# Patient Record
Sex: MALE | Race: WHITE | NOT HISPANIC OR LATINO | Employment: FULL TIME
[De-identification: names, ages, dates, MRNs, and addresses within clinical notes are randomized per-mention and may not be internally consistent; named-entity substitution may affect disease eponyms.]

---

## 2018-03-05 ENCOUNTER — HOSPITAL (OUTPATIENT)
Dept: OTHER | Age: 23
End: 2018-03-05
Attending: FAMILY MEDICINE

## 2018-11-10 ENCOUNTER — HOSPITAL (OUTPATIENT)
Dept: OTHER | Age: 23
End: 2018-11-10

## 2018-11-11 ENCOUNTER — DIAGNOSTIC TRANS (OUTPATIENT)
Dept: OTHER | Age: 23
End: 2018-11-11

## 2018-12-10 ENCOUNTER — OFFICE VISIT (OUTPATIENT)
Dept: NEUROLOGY | Age: 23
End: 2018-12-10

## 2018-12-10 ENCOUNTER — HOSPITAL (OUTPATIENT)
Dept: OTHER | Age: 23
End: 2018-12-10
Attending: FAMILY MEDICINE

## 2018-12-10 ENCOUNTER — TELEPHONE (OUTPATIENT)
Dept: NEUROLOGY | Age: 23
End: 2018-12-10

## 2018-12-10 VITALS
RESPIRATION RATE: 16 BRPM | DIASTOLIC BLOOD PRESSURE: 68 MMHG | SYSTOLIC BLOOD PRESSURE: 120 MMHG | HEIGHT: 69 IN | HEART RATE: 75 BPM | BODY MASS INDEX: 22.3 KG/M2 | WEIGHT: 150.57 LBS

## 2018-12-10 DIAGNOSIS — G62.9 NEUROPATHY: Primary | ICD-10-CM

## 2018-12-12 ENCOUNTER — APPOINTMENT (OUTPATIENT)
Dept: NEUROLOGY | Age: 23
End: 2018-12-12

## 2018-12-12 PROBLEM — G62.9 NEUROPATHY: Status: ACTIVE | Noted: 2018-12-12

## 2019-01-01 ENCOUNTER — HOSPITAL (OUTPATIENT)
Dept: OTHER | Age: 24
End: 2019-01-01
Attending: FAMILY MEDICINE

## 2019-01-10 ENCOUNTER — TELEPHONE (OUTPATIENT)
Dept: NEUROLOGY | Age: 24
End: 2019-01-10

## 2019-01-31 ENCOUNTER — HOSPITAL (OUTPATIENT)
Dept: OTHER | Age: 24
End: 2019-01-31
Attending: OTOLARYNGOLOGY

## 2019-02-01 ENCOUNTER — HOSPITAL (OUTPATIENT)
Dept: OTHER | Age: 24
End: 2019-02-01
Attending: FAMILY MEDICINE

## 2019-03-11 ENCOUNTER — HOSPITAL (OUTPATIENT)
Dept: OTHER | Age: 24
End: 2019-03-11
Attending: FAMILY MEDICINE

## 2019-04-01 ENCOUNTER — HOSPITAL (OUTPATIENT)
Dept: OTHER | Age: 24
End: 2019-04-01
Attending: FAMILY MEDICINE

## 2019-05-01 ENCOUNTER — HOSPITAL (OUTPATIENT)
Dept: OTHER | Age: 24
End: 2019-05-01
Attending: FAMILY MEDICINE

## 2020-03-06 ENCOUNTER — HOSPITAL ENCOUNTER (OUTPATIENT)
Dept: GENERAL RADIOLOGY | Age: 25
Discharge: HOME | End: 2020-03-06

## 2020-03-06 DIAGNOSIS — M25.531 PAIN IN RIGHT WRIST: ICD-10-CM

## 2020-03-06 PROCEDURE — 73110 X-RAY EXAM OF WRIST: CPT

## 2021-12-01 ENCOUNTER — LAB REQUISITION (OUTPATIENT)
Dept: LAB | Age: 26
End: 2021-12-01

## 2021-12-01 ENCOUNTER — HOSPITAL ENCOUNTER (OUTPATIENT)
Dept: GENERAL RADIOLOGY | Age: 26
Discharge: HOME | End: 2021-12-01

## 2021-12-01 ENCOUNTER — LAB SERVICES (OUTPATIENT)
Dept: LAB | Age: 26
End: 2021-12-01

## 2021-12-01 DIAGNOSIS — M25.561 PAIN IN RIGHT KNEE: ICD-10-CM

## 2021-12-01 DIAGNOSIS — R53.83 OTHER FATIGUE: ICD-10-CM

## 2021-12-01 DIAGNOSIS — Z00.00 ENCOUNTER FOR GENERAL ADULT MEDICAL EXAMINATION WITHOUT ABNORMAL FINDINGS: ICD-10-CM

## 2021-12-01 LAB
25(OH)D3+25(OH)D2 SERPL-MCNC: 22 NG/ML (ref 30–100)
ALBUMIN SERPL-MCNC: 4.3 G/DL (ref 3.6–5.1)
ALBUMIN/GLOB SERPL: 1.4 {RATIO} (ref 1–2.4)
ALP SERPL-CCNC: 42 UNITS/L (ref 45–117)
ALT SERPL-CCNC: 29 UNITS/L
ANION GAP SERPL CALC-SCNC: 10 MMOL/L (ref 10–20)
APPEARANCE UR: CLEAR
AST SERPL-CCNC: 15 UNITS/L
BASOPHILS # BLD: 0 K/MCL (ref 0–0.3)
BASOPHILS NFR BLD: 0 %
BILIRUB SERPL-MCNC: 0.7 MG/DL (ref 0.2–1)
BILIRUB UR QL STRIP: NEGATIVE
BUN SERPL-MCNC: 19 MG/DL (ref 6–20)
BUN/CREAT SERPL: 19 (ref 7–25)
CALCIUM SERPL-MCNC: 9.3 MG/DL (ref 8.4–10.2)
CHLORIDE SERPL-SCNC: 104 MMOL/L (ref 98–107)
CHOLEST SERPL-MCNC: 138 MG/DL
CHOLEST/HDLC SERPL: 2.7 {RATIO}
CO2 SERPL-SCNC: 30 MMOL/L (ref 21–32)
COLOR UR: YELLOW
CREAT SERPL-MCNC: 0.99 MG/DL (ref 0.67–1.17)
CRP SERPL-MCNC: <0.3 MG/DL
DEPRECATED RDW RBC: 38 FL (ref 39–50)
EOSINOPHIL # BLD: 0.2 K/MCL (ref 0–0.5)
EOSINOPHIL NFR BLD: 4 %
ERYTHROCYTE [DISTWIDTH] IN BLOOD: 11.9 % (ref 11–15)
ERYTHROCYTE [SEDIMENTATION RATE] IN BLOOD BY WESTERGREN METHOD: <1 MM/HR (ref 0–20)
FASTING DURATION TIME PATIENT: 0 HOURS (ref 0–999)
FASTING DURATION TIME PATIENT: 0 HOURS (ref 0–999)
GFR SERPLBLD BASED ON 1.73 SQ M-ARVRAT: >90 ML/MIN
GLOBULIN SER-MCNC: 3.1 G/DL (ref 2–4)
GLUCOSE SERPL-MCNC: 85 MG/DL (ref 70–99)
GLUCOSE UR STRIP-MCNC: NEGATIVE MG/DL
HCT VFR BLD CALC: 44.1 % (ref 39–51)
HDLC SERPL-MCNC: 51 MG/DL
HGB BLD-MCNC: 15.1 G/DL (ref 13–17)
HGB UR QL STRIP: NEGATIVE
IMM GRANULOCYTES # BLD AUTO: 0 K/MCL (ref 0–0.2)
IMM GRANULOCYTES # BLD: 0 %
KETONES UR STRIP-MCNC: NEGATIVE MG/DL
LDLC SERPL CALC-MCNC: 73 MG/DL
LEUKOCYTE ESTERASE UR QL STRIP: NEGATIVE
LYMPHOCYTES # BLD: 1.2 K/MCL (ref 1–4.8)
LYMPHOCYTES NFR BLD: 22 %
MCH RBC QN AUTO: 29.7 PG (ref 26–34)
MCHC RBC AUTO-ENTMCNC: 34.2 G/DL (ref 32–36.5)
MCV RBC AUTO: 86.8 FL (ref 78–100)
MONOCYTES # BLD: 0.3 K/MCL (ref 0.3–0.9)
MONOCYTES NFR BLD: 5 %
NEUTROPHILS # BLD: 3.7 K/MCL (ref 1.8–7.7)
NEUTROPHILS NFR BLD: 69 %
NITRITE UR QL STRIP: NEGATIVE
NONHDLC SERPL-MCNC: 87 MG/DL
NRBC BLD MANUAL-RTO: 0 /100 WBC
PH UR STRIP: 6 [PH] (ref 5–7)
PLATELET # BLD AUTO: 225 K/MCL (ref 140–450)
POTASSIUM SERPL-SCNC: 4 MMOL/L (ref 3.4–5.1)
PROT SERPL-MCNC: 7.4 G/DL (ref 6.4–8.2)
PROT UR STRIP-MCNC: NEGATIVE MG/DL
RBC # BLD: 5.08 MIL/MCL (ref 4.5–5.9)
SODIUM SERPL-SCNC: 140 MMOL/L (ref 135–145)
SP GR UR STRIP: 1.02 (ref 1–1.03)
TRIGL SERPL-MCNC: 69 MG/DL
TSH SERPL-ACNC: 1.13 MCUNITS/ML (ref 0.35–5)
UROBILINOGEN UR STRIP-MCNC: 0.2 MG/DL
VIT B12 SERPL-MCNC: 702 PG/ML (ref 211–911)
WBC # BLD: 5.4 K/MCL (ref 4.2–11)

## 2021-12-01 PROCEDURE — 85652 RBC SED RATE AUTOMATED: CPT | Performed by: CLINICAL MEDICAL LABORATORY

## 2021-12-01 PROCEDURE — 80053 COMPREHEN METABOLIC PANEL: CPT | Performed by: CLINICAL MEDICAL LABORATORY

## 2021-12-01 PROCEDURE — 86140 C-REACTIVE PROTEIN: CPT | Performed by: CLINICAL MEDICAL LABORATORY

## 2021-12-01 PROCEDURE — 83540 ASSAY OF IRON: CPT | Performed by: CLINICAL MEDICAL LABORATORY

## 2021-12-01 PROCEDURE — 82607 VITAMIN B-12: CPT | Performed by: CLINICAL MEDICAL LABORATORY

## 2021-12-01 PROCEDURE — 36415 COLL VENOUS BLD VENIPUNCTURE: CPT | Performed by: CLINICAL MEDICAL LABORATORY

## 2021-12-01 PROCEDURE — 84443 ASSAY THYROID STIM HORMONE: CPT | Performed by: CLINICAL MEDICAL LABORATORY

## 2021-12-01 PROCEDURE — 81003 URINALYSIS AUTO W/O SCOPE: CPT | Performed by: CLINICAL MEDICAL LABORATORY

## 2021-12-01 PROCEDURE — 85025 COMPLETE CBC W/AUTO DIFF WBC: CPT | Performed by: CLINICAL MEDICAL LABORATORY

## 2021-12-01 PROCEDURE — 73564 X-RAY EXAM KNEE 4 OR MORE: CPT

## 2021-12-01 PROCEDURE — 86038 ANTINUCLEAR ANTIBODIES: CPT | Performed by: CLINICAL MEDICAL LABORATORY

## 2021-12-01 PROCEDURE — 80061 LIPID PANEL: CPT | Performed by: CLINICAL MEDICAL LABORATORY

## 2021-12-01 PROCEDURE — 83550 IRON BINDING TEST: CPT | Performed by: CLINICAL MEDICAL LABORATORY

## 2021-12-01 PROCEDURE — 82306 VITAMIN D 25 HYDROXY: CPT | Performed by: CLINICAL MEDICAL LABORATORY

## 2021-12-01 PROCEDURE — 82728 ASSAY OF FERRITIN: CPT | Performed by: CLINICAL MEDICAL LABORATORY

## 2021-12-02 ENCOUNTER — HOSPITAL ENCOUNTER (OUTPATIENT)
Dept: MRI IMAGING | Age: 26
Discharge: HOME | End: 2021-12-02
Attending: ORTHOPAEDIC SURGERY

## 2021-12-02 DIAGNOSIS — M25.531 RIGHT WRIST PAIN: ICD-10-CM

## 2021-12-02 DIAGNOSIS — R22.31 MASS OF RIGHT WRIST: ICD-10-CM

## 2021-12-02 LAB — ANA SER QL IA: NEGATIVE

## 2021-12-02 PROCEDURE — 10002805 HB CONTRAST AGENT

## 2021-12-02 PROCEDURE — 73223 MRI JOINT UPR EXTR W/O&W/DYE: CPT

## 2021-12-02 PROCEDURE — A9585 GADOBUTROL INJECTION: HCPCS

## 2021-12-02 RX ORDER — GADOBUTROL 604.72 MG/ML
6.8 INJECTION INTRAVENOUS ONCE
Status: COMPLETED | OUTPATIENT
Start: 2021-12-02 | End: 2021-12-02

## 2021-12-02 RX ADMIN — GADOBUTROL 6.8 ML: 604.72 INJECTION INTRAVENOUS at 07:45

## 2021-12-06 LAB
FERRITIN SERPL-MCNC: 214 NG/ML (ref 26–388)
IRON SATN MFR SERPL: 29 % (ref 15–45)
IRON SERPL-MCNC: 87 MCG/DL (ref 65–175)
TIBC SERPL-MCNC: 296 MCG/DL (ref 250–450)

## 2022-01-11 ENCOUNTER — LAB SERVICES (OUTPATIENT)
Dept: LAB | Age: 27
End: 2022-01-11

## 2022-01-11 ENCOUNTER — LAB REQUISITION (OUTPATIENT)
Dept: LAB | Age: 27
End: 2022-01-11
Attending: FAMILY MEDICINE

## 2022-01-11 DIAGNOSIS — E55.9 VITAMIN D DEFICIENCY, UNSPECIFIED: ICD-10-CM

## 2022-01-11 LAB — 25(OH)D3+25(OH)D2 SERPL-MCNC: 48 NG/ML (ref 30–100)

## 2022-01-11 PROCEDURE — 82306 VITAMIN D 25 HYDROXY: CPT | Performed by: CLINICAL MEDICAL LABORATORY

## 2022-01-11 PROCEDURE — 36415 COLL VENOUS BLD VENIPUNCTURE: CPT | Performed by: CLINICAL MEDICAL LABORATORY

## 2022-01-25 ENCOUNTER — TELEPHONE (OUTPATIENT)
Dept: NEUROLOGY | Age: 27
End: 2022-01-25

## 2022-01-31 PROBLEM — M77.10 LATERAL EPICONDYLITIS: Status: ACTIVE | Noted: 2019-04-12

## 2022-01-31 PROBLEM — R42 DIZZINESS: Status: ACTIVE | Noted: 2022-01-31

## 2022-01-31 RX ORDER — TRIAMCINOLONE ACETONIDE 1 MG/G
CREAM TOPICAL
COMMUNITY
Start: 2021-10-26 | End: 2023-03-15 | Stop reason: SDUPTHER

## 2022-01-31 RX ORDER — NAPROXEN 500 MG/1
TABLET ORAL
COMMUNITY
Start: 2021-12-08 | End: 2022-08-17 | Stop reason: ALTCHOICE

## 2022-01-31 RX ORDER — OMEPRAZOLE 20 MG/1
CAPSULE, DELAYED RELEASE ORAL
COMMUNITY
Start: 2022-01-10 | End: 2022-08-17 | Stop reason: ALTCHOICE

## 2022-02-01 ENCOUNTER — OFFICE VISIT (OUTPATIENT)
Dept: NEUROLOGY | Age: 27
End: 2022-02-01

## 2022-02-01 VITALS
BODY MASS INDEX: 25.01 KG/M2 | HEART RATE: 75 BPM | SYSTOLIC BLOOD PRESSURE: 116 MMHG | DIASTOLIC BLOOD PRESSURE: 62 MMHG | WEIGHT: 168.87 LBS | HEIGHT: 69 IN

## 2022-02-01 DIAGNOSIS — R25.1 PHYSIOLOGICAL TREMOR: Primary | ICD-10-CM

## 2022-02-01 DIAGNOSIS — F41.9 ANXIETY, MILD: ICD-10-CM

## 2022-02-01 PROBLEM — M77.10 LATERAL EPICONDYLITIS: Status: RESOLVED | Noted: 2019-04-12 | Resolved: 2022-02-01

## 2022-02-01 PROBLEM — M79.641 RIGHT HAND PAIN: Status: ACTIVE | Noted: 2022-02-01

## 2022-02-01 PROCEDURE — 99204 OFFICE O/P NEW MOD 45 MIN: CPT | Performed by: PSYCHIATRY & NEUROLOGY

## 2022-03-22 ENCOUNTER — HOSPITAL ENCOUNTER (OUTPATIENT)
Dept: GENERAL RADIOLOGY | Age: 27
Discharge: HOME | End: 2022-03-22
Attending: FAMILY MEDICINE

## 2022-03-22 DIAGNOSIS — M54.50 LOW BACK PAIN: ICD-10-CM

## 2022-03-22 PROCEDURE — 72100 X-RAY EXAM L-S SPINE 2/3 VWS: CPT

## 2022-04-07 ENCOUNTER — HOSPITAL ENCOUNTER (OUTPATIENT)
Dept: PHYSICAL MEDICINE AND REHAB | Age: 27
Discharge: STILL ADMITTED OR ADMINISTRATIVE | End: 2022-04-07

## 2022-04-07 DIAGNOSIS — M54.50 LOW BACK PAIN: Primary | ICD-10-CM

## 2022-04-07 DIAGNOSIS — M25.559 HIP PAIN: ICD-10-CM

## 2022-04-07 PROCEDURE — 97161 PT EVAL LOW COMPLEX 20 MIN: CPT

## 2022-04-07 PROCEDURE — 97110 THERAPEUTIC EXERCISES: CPT

## 2022-04-07 ASSESSMENT — MOVEMENT AND STRENGTH ASSESSMENTS
SITTING FOR 1 HOUR: A LITTLE BIT OF DIFFICULTY
YOUR USUAL HOBBIES, RECREATIONAL OR SPORTING ACTIVIITIES: MODERATE DIFFICULTY
GETTING INTO OR OUT OF THE BATH: NO DIFFICULTY
TOTAL SCORE: 91.25
WALKING 2 BLOCKS: NO DIFFICULTY
RUNNING ON EVEN GROUND: NO DIFFICULTY
GOING UP OR DOWN 10 STAIRS (ABOUT 1 FLIGHT OF STAIRS): NO DIFFICULTY
HOPPING: NO DIFFICULTY
SQUATTING: MODERATE DIFFICULTY
RUNNING ON UNEVEN GROUND: NO DIFFICULTY
PERFORMING HEAVY ACTIVITIES AROUND YOUR HOME: A LITTLE BIT OF DIFFICULTY
STANDING FOR 1 HOUR: A LITTLE BIT OF DIFFICULTY
MAKING SHARP TURNS WHILE RUNNING FAST: NO DIFFICULTY
ROLLING OVER IN BED: NO DIFFICULTY
PUTTING ON YOUR SHOES OR SOCKS: NO DIFFICULTY
ANY OF YOUR USUAL WORK, HOUSEWORK OR SCHOOL ACTIVITIES: NO DIFFICULTY
GETTING INTO OR OUT OF A CAR: NO DIFFICULTY
LIFTING AN OBJECT, LIKE A BAG OF GROCERIES, FROM THE FLOOR: NO DIFFICULTY
WALKING BETWEEN ROOMS: NO DIFFICULTY
WALKING A MILE: NO DIFFICULTY
PERFORMING LIGHT ACTIVITES AROUND YOUR HOME: NO DIFFICULTY

## 2022-04-07 ASSESSMENT — ENCOUNTER SYMPTOMS
ALLEVIATING FACTORS: MASSAGE
QUALITY: SORE
QUALITY: TIGHT
QUALITY: ACHE
PAIN SEVERITY NOW: 4
PAIN SCALE AT HIGHEST: 6
PAIN FREQUENCY: CONSTANT
SUBJECTIVE PAIN PROGRESSION: NO CHANGE

## 2022-04-12 ENCOUNTER — HOSPITAL ENCOUNTER (OUTPATIENT)
Dept: PHYSICAL MEDICINE AND REHAB | Age: 27
Discharge: STILL ADMITTED OR ADMINISTRATIVE | End: 2022-04-12

## 2022-04-12 PROCEDURE — 97110 THERAPEUTIC EXERCISES: CPT

## 2022-04-12 PROCEDURE — 97140 MANUAL THERAPY 1/> REGIONS: CPT

## 2022-04-18 ENCOUNTER — APPOINTMENT (OUTPATIENT)
Dept: REHABILITATION | Age: 27
End: 2022-04-18

## 2022-04-19 ENCOUNTER — APPOINTMENT (OUTPATIENT)
Dept: PHYSICAL MEDICINE AND REHAB | Age: 27
End: 2022-04-19

## 2022-04-25 ENCOUNTER — HOSPITAL ENCOUNTER (OUTPATIENT)
Dept: REHABILITATION | Age: 27
Discharge: STILL ADMITTED OR ADMINISTRATIVE | End: 2022-04-25

## 2022-04-25 ENCOUNTER — APPOINTMENT (OUTPATIENT)
Dept: PHYSICAL MEDICINE AND REHAB | Age: 27
End: 2022-04-25

## 2022-04-25 PROCEDURE — 97110 THERAPEUTIC EXERCISES: CPT | Performed by: PHYSICAL THERAPIST

## 2022-04-25 PROCEDURE — 97140 MANUAL THERAPY 1/> REGIONS: CPT | Performed by: PHYSICAL THERAPIST

## 2022-04-25 PROCEDURE — 97530 THERAPEUTIC ACTIVITIES: CPT | Performed by: PHYSICAL THERAPIST

## 2022-05-02 ENCOUNTER — HOSPITAL ENCOUNTER (OUTPATIENT)
Dept: REHABILITATION | Age: 27
Discharge: STILL ADMITTED OR ADMINISTRATIVE | End: 2022-05-02

## 2022-05-02 PROCEDURE — 97530 THERAPEUTIC ACTIVITIES: CPT | Performed by: PHYSICAL THERAPIST

## 2022-05-02 PROCEDURE — 97110 THERAPEUTIC EXERCISES: CPT | Performed by: PHYSICAL THERAPIST

## 2022-05-02 PROCEDURE — 97140 MANUAL THERAPY 1/> REGIONS: CPT | Performed by: PHYSICAL THERAPIST

## 2022-05-09 ENCOUNTER — HOSPITAL ENCOUNTER (OUTPATIENT)
Dept: REHABILITATION | Age: 27
Discharge: STILL ADMITTED OR ADMINISTRATIVE | End: 2022-05-09

## 2022-05-09 PROCEDURE — 97530 THERAPEUTIC ACTIVITIES: CPT | Performed by: PHYSICAL THERAPIST

## 2022-05-09 PROCEDURE — 97110 THERAPEUTIC EXERCISES: CPT | Performed by: PHYSICAL THERAPIST

## 2022-05-09 PROCEDURE — 97140 MANUAL THERAPY 1/> REGIONS: CPT | Performed by: PHYSICAL THERAPIST

## 2022-05-19 ENCOUNTER — APPOINTMENT (OUTPATIENT)
Dept: NEUROLOGY | Age: 27
End: 2022-05-19

## 2022-05-27 ENCOUNTER — APPOINTMENT (OUTPATIENT)
Dept: REHABILITATION | Age: 27
End: 2022-05-27

## 2022-06-07 ENCOUNTER — LAB REQUISITION (OUTPATIENT)
Dept: LAB | Age: 27
End: 2022-06-07

## 2022-06-07 ENCOUNTER — LAB SERVICES (OUTPATIENT)
Dept: LAB | Age: 27
End: 2022-06-07

## 2022-06-07 DIAGNOSIS — R53.83 OTHER FATIGUE: ICD-10-CM

## 2022-06-07 DIAGNOSIS — E55.9 VITAMIN D DEFICIENCY, UNSPECIFIED: ICD-10-CM

## 2022-06-07 DIAGNOSIS — B35.1 TINEA UNGUIUM: ICD-10-CM

## 2022-06-07 LAB
25(OH)D3+25(OH)D2 SERPL-MCNC: 29.3 NG/ML (ref 30–100)
ALBUMIN SERPL-MCNC: 4.6 G/DL (ref 3.6–5.1)
ALBUMIN/GLOB SERPL: 1.7 {RATIO} (ref 1–2.4)
ALP SERPL-CCNC: 37 UNITS/L (ref 45–117)
ALT SERPL-CCNC: 27 UNITS/L
ANION GAP SERPL CALC-SCNC: 8 MMOL/L (ref 7–19)
AST SERPL-CCNC: 15 UNITS/L
BASOPHILS # BLD: 0 K/MCL (ref 0–0.3)
BASOPHILS NFR BLD: 0 %
BILIRUB SERPL-MCNC: 1.1 MG/DL (ref 0.2–1)
BUN SERPL-MCNC: 19 MG/DL (ref 6–20)
BUN/CREAT SERPL: 18 (ref 7–25)
CALCIUM SERPL-MCNC: 9.6 MG/DL (ref 8.4–10.2)
CHLORIDE SERPL-SCNC: 102 MMOL/L (ref 97–110)
CO2 SERPL-SCNC: 31 MMOL/L (ref 21–32)
CREAT SERPL-MCNC: 1.06 MG/DL (ref 0.67–1.17)
DEPRECATED RDW RBC: 38.4 FL (ref 39–50)
EOSINOPHIL # BLD: 0.2 K/MCL (ref 0–0.5)
EOSINOPHIL NFR BLD: 2 %
ERYTHROCYTE [DISTWIDTH] IN BLOOD: 12 % (ref 11–15)
FASTING DURATION TIME PATIENT: 8 HOURS (ref 0–999)
GFR SERPLBLD BASED ON 1.73 SQ M-ARVRAT: >90 ML/MIN
GLOBULIN SER-MCNC: 2.7 G/DL (ref 2–4)
GLUCOSE SERPL-MCNC: 99 MG/DL (ref 70–99)
HCT VFR BLD CALC: 42.6 % (ref 39–51)
HGB BLD-MCNC: 14.5 G/DL (ref 13–17)
IMM GRANULOCYTES # BLD AUTO: 0 K/MCL (ref 0–0.2)
IMM GRANULOCYTES # BLD: 0 %
KOH PREP SPEC: NORMAL
LYMPHOCYTES # BLD: 1.7 K/MCL (ref 1–4.8)
LYMPHOCYTES NFR BLD: 24 %
MCH RBC QN AUTO: 29.7 PG (ref 26–34)
MCHC RBC AUTO-ENTMCNC: 34 G/DL (ref 32–36.5)
MCV RBC AUTO: 87.3 FL (ref 78–100)
MONOCYTES # BLD: 0.4 K/MCL (ref 0.3–0.9)
MONOCYTES NFR BLD: 6 %
NEUTROPHILS # BLD: 4.8 K/MCL (ref 1.8–7.7)
NEUTROPHILS NFR BLD: 68 %
NRBC BLD MANUAL-RTO: 0 /100 WBC
PLATELET # BLD AUTO: 234 K/MCL (ref 140–450)
POTASSIUM SERPL-SCNC: 3.8 MMOL/L (ref 3.4–5.1)
PROT SERPL-MCNC: 7.3 G/DL (ref 6.4–8.2)
RBC # BLD: 4.88 MIL/MCL (ref 4.5–5.9)
SODIUM SERPL-SCNC: 137 MMOL/L (ref 135–145)
TSH SERPL-ACNC: 2.89 MCUNITS/ML (ref 0.35–5)
VIT B12 SERPL-MCNC: 786 PG/ML (ref 211–911)
WBC # BLD: 7 K/MCL (ref 4.2–11)

## 2022-06-07 PROCEDURE — 84443 ASSAY THYROID STIM HORMONE: CPT | Performed by: CLINICAL MEDICAL LABORATORY

## 2022-06-07 PROCEDURE — 87220 TISSUE EXAM FOR FUNGI: CPT | Performed by: CLINICAL MEDICAL LABORATORY

## 2022-06-07 PROCEDURE — 82607 VITAMIN B-12: CPT | Performed by: CLINICAL MEDICAL LABORATORY

## 2022-06-07 PROCEDURE — 87101 SKIN FUNGI CULTURE: CPT | Performed by: CLINICAL MEDICAL LABORATORY

## 2022-06-07 PROCEDURE — 80053 COMPREHEN METABOLIC PANEL: CPT | Performed by: CLINICAL MEDICAL LABORATORY

## 2022-06-07 PROCEDURE — 85025 COMPLETE CBC W/AUTO DIFF WBC: CPT | Performed by: CLINICAL MEDICAL LABORATORY

## 2022-06-07 PROCEDURE — 82306 VITAMIN D 25 HYDROXY: CPT | Performed by: CLINICAL MEDICAL LABORATORY

## 2022-06-07 PROCEDURE — 36415 COLL VENOUS BLD VENIPUNCTURE: CPT | Performed by: CLINICAL MEDICAL LABORATORY

## 2022-06-23 ENCOUNTER — HOSPITAL ENCOUNTER (EMERGENCY)
Age: 27
Discharge: HOME | End: 2022-06-23
Attending: EMERGENCY MEDICINE

## 2022-06-23 VITALS
BODY MASS INDEX: 24.82 KG/M2 | WEIGHT: 167.55 LBS | RESPIRATION RATE: 18 BRPM | HEIGHT: 69 IN | TEMPERATURE: 98.4 F | DIASTOLIC BLOOD PRESSURE: 74 MMHG | HEART RATE: 67 BPM | OXYGEN SATURATION: 98 % | SYSTOLIC BLOOD PRESSURE: 121 MMHG
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
## 2022-06-23 DIAGNOSIS — M25.559 HIP PAIN: ICD-10-CM

## 2022-06-23 DIAGNOSIS — S16.1XXA STRAIN OF NECK MUSCLE, INITIAL ENCOUNTER: ICD-10-CM

## 2022-06-23 PROCEDURE — 99284 EMERGENCY DEPT VISIT MOD MDM: CPT | Performed by: EMERGENCY MEDICINE

## 2022-06-23 PROCEDURE — 99284 EMERGENCY DEPT VISIT MOD MDM: CPT

## 2022-06-23 PROCEDURE — 10002803 HB RX 637: Performed by: EMERGENCY MEDICINE

## 2022-06-23 RX ORDER — DIAZEPAM 5 MG/1
5 TABLET ORAL EVERY 8 HOURS PRN
Qty: 15 TABLET | Refills: 0 | Status: SHIPPED | OUTPATIENT
Start: 2022-06-23 | End: 2022-06-28

## 2022-06-23 RX ORDER — IBUPROFEN 200 MG
400 TABLET ORAL ONCE
Status: COMPLETED | OUTPATIENT
Start: 2022-06-23 | End: 2022-06-23

## 2022-06-23 RX ADMIN — IBUPROFEN 400 MG: 200 TABLET, FILM COATED ORAL at 13:58

## 2022-06-23 ASSESSMENT — ENCOUNTER SYMPTOMS
VOMITING: 0
NUMBNESS: 0
SHORTNESS OF BREATH: 0
WEAKNESS: 0
NAUSEA: 0
HEADACHES: 0
ABDOMINAL PAIN: 0
LIGHT-HEADEDNESS: 0

## 2022-06-23 ASSESSMENT — PAIN SCALES - GENERAL
PAINLEVEL_OUTOF10: 5
PAINLEVEL_OUTOF10: 6

## 2022-06-27 ENCOUNTER — APPOINTMENT (OUTPATIENT)
Dept: NEUROLOGY | Age: 27
End: 2022-06-27

## 2022-07-01 ENCOUNTER — TELEPHONE (OUTPATIENT)
Dept: NEUROLOGY | Age: 27
End: 2022-07-01

## 2022-07-01 ENCOUNTER — HOSPITAL ENCOUNTER (OUTPATIENT)
Dept: REHABILITATION | Age: 27
Discharge: STILL ADMITTED OR ADMINISTRATIVE | End: 2022-07-01
Attending: FAMILY MEDICINE

## 2022-07-01 PROCEDURE — 97161 PT EVAL LOW COMPLEX 20 MIN: CPT | Performed by: PHYSICAL THERAPIST

## 2022-07-01 PROCEDURE — 97110 THERAPEUTIC EXERCISES: CPT | Performed by: PHYSICAL THERAPIST

## 2022-07-01 ASSESSMENT — ENCOUNTER SYMPTOMS
QUALITY: TIGHT
PAIN SEVERITY NOW: 0
PAIN LOCATION: SHOULDER
QUALITY: STIFF
PAIN SCALE AT LOWEST: 0
PAIN SCALE AT HIGHEST: 5
ALLEVIATING FACTOR: BIOFREEZE

## 2022-07-01 ASSESSMENT — MOVEMENT AND STRENGTH ASSESSMENTS
RUNNING ON UNEVEN GROUND: MODERATE DIFFICULTY
YOUR USUAL HOBBIES, RECREATIONAL OR SPORTING ACTIVIITIES: A LITTLE BIT OF DIFFICULTY
GOING UP OR DOWN 10 STAIRS (ABOUT 1 FLIGHT OF STAIRS): NO DIFFICULTY
MAKING SHARP TURNS WHILE RUNNING FAST: MODERATE DIFFICULTY
LIFTING AN OBJECT, LIKE A BAG OF GROCERIES, FROM THE FLOOR: A LITTLE BIT OF DIFFICULTY
PERFORMING LIGHT ACTIVITES AROUND YOUR HOME: NO DIFFICULTY
SITTING FOR 1 HOUR: NO DIFFICULTY
GETTING INTO OR OUT OF A CAR: NO DIFFICULTY
PERFORMING HEAVY ACTIVITIES AROUND YOUR HOME: A LITTLE BIT OF DIFFICULTY
SQUATTING: A LITTLE BIT OF DIFFICULTY
HOPPING: NO DIFFICULTY
GETTING INTO OR OUT OF THE BATH: NO DIFFICULTY
TOTAL SCORE: 85
WALKING 2 BLOCKS: NO DIFFICULTY
ANY OF YOUR USUAL WORK, HOUSEWORK OR SCHOOL ACTIVITIES: NO DIFFICULTY
WALKING A MILE: A LITTLE BIT OF DIFFICULTY
PUTTING ON YOUR SHOES OR SOCKS: NO DIFFICULTY
ROLLING OVER IN BED: NO DIFFICULTY
RUNNING ON EVEN GROUND: MODERATE DIFFICULTY
WALKING BETWEEN ROOMS: NO DIFFICULTY
STANDING FOR 1 HOUR: A LITTLE BIT OF DIFFICULTY

## 2022-07-05 LAB — FUNGUS SPEC CULT: NORMAL

## 2022-07-06 ENCOUNTER — APPOINTMENT (OUTPATIENT)
Dept: REHABILITATION | Age: 27
End: 2022-07-06
Attending: FAMILY MEDICINE

## 2022-07-07 ENCOUNTER — TELEPHONE (OUTPATIENT)
Dept: NEUROLOGY | Age: 27
End: 2022-07-07

## 2022-07-07 ENCOUNTER — APPOINTMENT (OUTPATIENT)
Dept: NEUROLOGY | Age: 27
End: 2022-07-07

## 2022-07-11 ENCOUNTER — HOSPITAL ENCOUNTER (OUTPATIENT)
Dept: REHABILITATION | Age: 27
Discharge: STILL ADMITTED OR ADMINISTRATIVE | End: 2022-07-11
Attending: FAMILY MEDICINE

## 2022-07-11 ENCOUNTER — APPOINTMENT (OUTPATIENT)
Dept: NEUROLOGY | Age: 27
End: 2022-07-11

## 2022-07-11 DIAGNOSIS — M25.512 LEFT SHOULDER PAIN: ICD-10-CM

## 2022-07-11 DIAGNOSIS — M25.552 BILATERAL HIP PAIN: Primary | ICD-10-CM

## 2022-07-11 DIAGNOSIS — M25.551 BILATERAL HIP PAIN: Primary | ICD-10-CM

## 2022-07-11 PROCEDURE — 97140 MANUAL THERAPY 1/> REGIONS: CPT | Performed by: PHYSICAL THERAPIST

## 2022-07-11 PROCEDURE — 97110 THERAPEUTIC EXERCISES: CPT | Performed by: PHYSICAL THERAPIST

## 2022-07-20 ENCOUNTER — APPOINTMENT (OUTPATIENT)
Dept: REHABILITATION | Age: 27
End: 2022-07-20
Attending: FAMILY MEDICINE

## 2022-07-25 ENCOUNTER — APPOINTMENT (OUTPATIENT)
Dept: REHABILITATION | Age: 27
End: 2022-07-25
Attending: FAMILY MEDICINE

## 2022-08-01 ENCOUNTER — HOSPITAL ENCOUNTER (OUTPATIENT)
Dept: REHABILITATION | Age: 27
Discharge: STILL ADMITTED OR ADMINISTRATIVE | End: 2022-08-01
Attending: FAMILY MEDICINE

## 2022-08-01 PROCEDURE — 97140 MANUAL THERAPY 1/> REGIONS: CPT | Performed by: PHYSICAL THERAPIST

## 2022-08-01 PROCEDURE — 97110 THERAPEUTIC EXERCISES: CPT | Performed by: PHYSICAL THERAPIST

## 2022-08-15 ENCOUNTER — TELEPHONE (OUTPATIENT)
Dept: SCHEDULING | Age: 27
End: 2022-08-15

## 2022-08-17 ENCOUNTER — OFFICE VISIT (OUTPATIENT)
Dept: FAMILY MEDICINE | Age: 27
End: 2022-08-17

## 2022-08-17 ENCOUNTER — LAB SERVICES (OUTPATIENT)
Dept: LAB | Age: 27
End: 2022-08-17

## 2022-08-17 VITALS
HEIGHT: 68 IN | BODY MASS INDEX: 24.69 KG/M2 | HEART RATE: 87 BPM | WEIGHT: 162.92 LBS | OXYGEN SATURATION: 97 % | TEMPERATURE: 97.8 F | SYSTOLIC BLOOD PRESSURE: 113 MMHG | DIASTOLIC BLOOD PRESSURE: 66 MMHG

## 2022-08-17 DIAGNOSIS — Z13.228 SCREENING FOR ENDOCRINE, METABOLIC AND IMMUNITY DISORDER: ICD-10-CM

## 2022-08-17 DIAGNOSIS — Z13.0 SCREENING FOR ENDOCRINE, METABOLIC AND IMMUNITY DISORDER: ICD-10-CM

## 2022-08-17 DIAGNOSIS — N52.9 ERECTILE DYSFUNCTION, UNSPECIFIED ERECTILE DYSFUNCTION TYPE: Primary | ICD-10-CM

## 2022-08-17 DIAGNOSIS — Z13.29 SCREENING FOR ENDOCRINE, METABOLIC AND IMMUNITY DISORDER: ICD-10-CM

## 2022-08-17 DIAGNOSIS — R19.5 LOOSE STOOLS: ICD-10-CM

## 2022-08-17 PROCEDURE — 83550 IRON BINDING TEST: CPT | Performed by: INTERNAL MEDICINE

## 2022-08-17 PROCEDURE — 84439 ASSAY OF FREE THYROXINE: CPT | Performed by: INTERNAL MEDICINE

## 2022-08-17 PROCEDURE — 80061 LIPID PANEL: CPT | Performed by: INTERNAL MEDICINE

## 2022-08-17 PROCEDURE — 80053 COMPREHEN METABOLIC PANEL: CPT | Performed by: INTERNAL MEDICINE

## 2022-08-17 PROCEDURE — 83540 ASSAY OF IRON: CPT | Performed by: INTERNAL MEDICINE

## 2022-08-17 PROCEDURE — 36415 COLL VENOUS BLD VENIPUNCTURE: CPT | Performed by: INTERNAL MEDICINE

## 2022-08-17 PROCEDURE — 85025 COMPLETE CBC W/AUTO DIFF WBC: CPT | Performed by: INTERNAL MEDICINE

## 2022-08-17 PROCEDURE — 99204 OFFICE O/P NEW MOD 45 MIN: CPT | Performed by: FAMILY MEDICINE

## 2022-08-17 PROCEDURE — 86038 ANTINUCLEAR ANTIBODIES: CPT | Performed by: INTERNAL MEDICINE

## 2022-08-17 PROCEDURE — 84443 ASSAY THYROID STIM HORMONE: CPT | Performed by: INTERNAL MEDICINE

## 2022-08-17 ASSESSMENT — PATIENT HEALTH QUESTIONNAIRE - PHQ9
SUM OF ALL RESPONSES TO PHQ9 QUESTIONS 1 AND 2: 0
SUM OF ALL RESPONSES TO PHQ9 QUESTIONS 1 AND 2: 0
1. LITTLE INTEREST OR PLEASURE IN DOING THINGS: NOT AT ALL
2. FEELING DOWN, DEPRESSED OR HOPELESS: NOT AT ALL
CLINICAL INTERPRETATION OF PHQ2 SCORE: NO FURTHER SCREENING NEEDED

## 2022-08-18 ENCOUNTER — APPOINTMENT (OUTPATIENT)
Dept: REHABILITATION | Age: 27
End: 2022-08-18
Attending: FAMILY MEDICINE

## 2022-08-18 PROBLEM — N52.9 ERECTILE DYSFUNCTION: Status: ACTIVE | Noted: 2022-08-18

## 2022-08-18 PROBLEM — R19.5 LOOSE STOOLS: Status: ACTIVE | Noted: 2022-08-18

## 2022-08-18 PROBLEM — M79.641 RIGHT HAND PAIN: Status: RESOLVED | Noted: 2022-02-01 | Resolved: 2022-08-18

## 2022-08-18 LAB
ALBUMIN SERPL-MCNC: 4.6 G/DL (ref 3.6–5.1)
ALBUMIN/GLOB SERPL: 1.6 {RATIO} (ref 1–2.4)
ALP SERPL-CCNC: 44 UNITS/L (ref 45–117)
ALT SERPL-CCNC: 21 UNITS/L
ANA SER QL IA: NEGATIVE
ANION GAP SERPL CALC-SCNC: 10 MMOL/L (ref 7–19)
AST SERPL-CCNC: 16 UNITS/L
BASOPHILS # BLD: 0 K/MCL (ref 0–0.3)
BASOPHILS NFR BLD: 0 %
BILIRUB SERPL-MCNC: 1 MG/DL (ref 0.2–1)
BUN SERPL-MCNC: 22 MG/DL (ref 6–20)
BUN/CREAT SERPL: 20 (ref 7–25)
CALCIUM SERPL-MCNC: 9.4 MG/DL (ref 8.4–10.2)
CHLORIDE SERPL-SCNC: 105 MMOL/L (ref 97–110)
CHOLEST SERPL-MCNC: 111 MG/DL
CHOLEST/HDLC SERPL: 2.5 {RATIO}
CO2 SERPL-SCNC: 28 MMOL/L (ref 21–32)
CREAT SERPL-MCNC: 1.12 MG/DL (ref 0.67–1.17)
DEPRECATED RDW RBC: 37.2 FL (ref 39–50)
EOSINOPHIL # BLD: 0 K/MCL (ref 0–0.5)
EOSINOPHIL NFR BLD: 1 %
ERYTHROCYTE [DISTWIDTH] IN BLOOD: 11.9 % (ref 11–15)
FASTING DURATION TIME PATIENT: 0 HOURS (ref 0–999)
FASTING DURATION TIME PATIENT: 0 HOURS (ref 0–999)
GFR SERPLBLD BASED ON 1.73 SQ M-ARVRAT: >90 ML/MIN
GLOBULIN SER-MCNC: 2.9 G/DL (ref 2–4)
GLUCOSE SERPL-MCNC: 102 MG/DL (ref 70–99)
HCT VFR BLD CALC: 43.7 % (ref 39–51)
HDLC SERPL-MCNC: 45 MG/DL
HGB BLD-MCNC: 15.6 G/DL (ref 13–17)
IMM GRANULOCYTES # BLD AUTO: 0 K/MCL (ref 0–0.2)
IMM GRANULOCYTES # BLD: 0 %
IRON SATN MFR SERPL: 35 % (ref 15–45)
IRON SERPL-MCNC: 108 MCG/DL (ref 65–175)
LDLC SERPL CALC-MCNC: 50 MG/DL
LYMPHOCYTES # BLD: 1.3 K/MCL (ref 1–4.8)
LYMPHOCYTES NFR BLD: 15 %
MCH RBC QN AUTO: 30.5 PG (ref 26–34)
MCHC RBC AUTO-ENTMCNC: 35.7 G/DL (ref 32–36.5)
MCV RBC AUTO: 85.5 FL (ref 78–100)
MONOCYTES # BLD: 0.4 K/MCL (ref 0.3–0.9)
MONOCYTES NFR BLD: 5 %
NEUTROPHILS # BLD: 7 K/MCL (ref 1.8–7.7)
NEUTROPHILS NFR BLD: 79 %
NONHDLC SERPL-MCNC: 66 MG/DL
NRBC BLD MANUAL-RTO: 0 /100 WBC
PLATELET # BLD AUTO: 224 K/MCL (ref 140–450)
POTASSIUM SERPL-SCNC: 4 MMOL/L (ref 3.4–5.1)
PROT SERPL-MCNC: 7.5 G/DL (ref 6.4–8.2)
RBC # BLD: 5.11 MIL/MCL (ref 4.5–5.9)
SODIUM SERPL-SCNC: 139 MMOL/L (ref 135–145)
T4 FREE SERPL-MCNC: 1.1 NG/DL (ref 0.8–1.5)
TIBC SERPL-MCNC: 312 MCG/DL (ref 250–450)
TRIGL SERPL-MCNC: 80 MG/DL
TSH SERPL-ACNC: 0.63 MCUNITS/ML (ref 0.35–5)
WBC # BLD: 8.8 K/MCL (ref 4.2–11)

## 2022-08-18 ASSESSMENT — ENCOUNTER SYMPTOMS
EYE REDNESS: 0
NAUSEA: 0
VOMITING: 0
HALLUCINATIONS: 0
SPEECH CHANGE: 0
COUGH: 0
EYE DISCHARGE: 0
SHORTNESS OF BREATH: 0
FEVER: 0
WHEEZING: 0
SEIZURES: 0
BLURRED VISION: 0
HEARTBURN: 0
CHILLS: 0

## 2022-08-25 ENCOUNTER — TELEPHONE (OUTPATIENT)
Dept: SCHEDULING | Age: 27
End: 2022-08-25

## 2022-09-06 ENCOUNTER — APPOINTMENT (OUTPATIENT)
Dept: REHABILITATION | Age: 27
End: 2022-09-06
Attending: FAMILY MEDICINE

## 2022-09-16 ENCOUNTER — OFFICE VISIT (OUTPATIENT)
Dept: INTERNAL MEDICINE | Age: 27
End: 2022-09-16

## 2022-09-16 VITALS — DIASTOLIC BLOOD PRESSURE: 72 MMHG | TEMPERATURE: 97.1 F | SYSTOLIC BLOOD PRESSURE: 119 MMHG | HEART RATE: 82 BPM

## 2022-09-16 DIAGNOSIS — J02.9 SORE THROAT: ICD-10-CM

## 2022-09-16 DIAGNOSIS — J02.9 ACUTE PHARYNGITIS, UNSPECIFIED ETIOLOGY: Primary | ICD-10-CM

## 2022-09-16 LAB
INTERNAL PROCEDURAL CONTROLS ACCEPTABLE: YES
INTERNAL PROCEDURAL CONTROLS ACCEPTABLE: YES
S PYO AG THROAT QL IA.RAPID: NEGATIVE
SARS-COV+SARS-COV-2 AG RESP QL IA.RAPID: NOT DETECTED

## 2022-09-16 PROCEDURE — 87880 STREP A ASSAY W/OPTIC: CPT | Performed by: STUDENT IN AN ORGANIZED HEALTH CARE EDUCATION/TRAINING PROGRAM

## 2022-09-16 PROCEDURE — 87426 SARSCOV CORONAVIRUS AG IA: CPT | Performed by: STUDENT IN AN ORGANIZED HEALTH CARE EDUCATION/TRAINING PROGRAM

## 2022-09-16 PROCEDURE — 99213 OFFICE O/P EST LOW 20 MIN: CPT | Performed by: STUDENT IN AN ORGANIZED HEALTH CARE EDUCATION/TRAINING PROGRAM

## 2022-09-16 ASSESSMENT — PATIENT HEALTH QUESTIONNAIRE - PHQ9
SUM OF ALL RESPONSES TO PHQ9 QUESTIONS 1 AND 2: 0
CLINICAL INTERPRETATION OF PHQ2 SCORE: NO FURTHER SCREENING NEEDED
SUM OF ALL RESPONSES TO PHQ9 QUESTIONS 1 AND 2: 0
2. FEELING DOWN, DEPRESSED OR HOPELESS: NOT AT ALL
1. LITTLE INTEREST OR PLEASURE IN DOING THINGS: NOT AT ALL

## 2022-09-16 ASSESSMENT — ENCOUNTER SYMPTOMS
FATIGUE: 0
WEAKNESS: 0
RHINORRHEA: 0
APPETITE CHANGE: 0
APNEA: 0
POLYPHAGIA: 0
CONSTIPATION: 0
ABDOMINAL DISTENTION: 0
SORE THROAT: 1
WHEEZING: 0
SHORTNESS OF BREATH: 0
CHILLS: 0
LIGHT-HEADEDNESS: 0
CHEST TIGHTNESS: 0
DIZZINESS: 0

## 2022-09-16 ASSESSMENT — PAIN SCALES - GENERAL: PAINLEVEL: 3

## 2022-09-20 ENCOUNTER — APPOINTMENT (OUTPATIENT)
Dept: REHABILITATION | Age: 27
End: 2022-09-20
Attending: FAMILY MEDICINE

## 2022-10-24 ENCOUNTER — TELEPHONE (OUTPATIENT)
Dept: SCHEDULING | Age: 27
End: 2022-10-24

## 2022-10-29 ENCOUNTER — APPOINTMENT (OUTPATIENT)
Dept: FAMILY MEDICINE | Age: 27
End: 2022-10-29

## 2022-11-03 ENCOUNTER — APPOINTMENT (OUTPATIENT)
Dept: INTERNAL MEDICINE | Age: 27
End: 2022-11-03

## 2022-11-15 ENCOUNTER — APPOINTMENT (OUTPATIENT)
Dept: FAMILY MEDICINE | Age: 27
End: 2022-11-15

## 2022-11-30 ENCOUNTER — OFFICE VISIT (OUTPATIENT)
Dept: FAMILY MEDICINE | Age: 27
End: 2022-11-30

## 2022-11-30 VITALS
HEART RATE: 74 BPM | SYSTOLIC BLOOD PRESSURE: 118 MMHG | DIASTOLIC BLOOD PRESSURE: 73 MMHG | TEMPERATURE: 96.9 F | BODY MASS INDEX: 24.21 KG/M2 | OXYGEN SATURATION: 98 % | WEIGHT: 159.72 LBS | HEIGHT: 68 IN

## 2022-11-30 DIAGNOSIS — M25.512 CHRONIC LEFT SHOULDER PAIN: Primary | ICD-10-CM

## 2022-11-30 DIAGNOSIS — G89.29 CHRONIC LEFT SHOULDER PAIN: Primary | ICD-10-CM

## 2022-11-30 DIAGNOSIS — R53.82 CHRONIC FATIGUE: ICD-10-CM

## 2022-11-30 PROCEDURE — 99213 OFFICE O/P EST LOW 20 MIN: CPT | Performed by: FAMILY MEDICINE

## 2022-11-30 ASSESSMENT — PATIENT HEALTH QUESTIONNAIRE - PHQ9
1. LITTLE INTEREST OR PLEASURE IN DOING THINGS: NOT AT ALL
2. FEELING DOWN, DEPRESSED OR HOPELESS: NOT AT ALL
SUM OF ALL RESPONSES TO PHQ9 QUESTIONS 1 AND 2: 0
SUM OF ALL RESPONSES TO PHQ9 QUESTIONS 1 AND 2: 0
CLINICAL INTERPRETATION OF PHQ2 SCORE: NO FURTHER SCREENING NEEDED

## 2022-12-02 ASSESSMENT — ENCOUNTER SYMPTOMS
EYE DISCHARGE: 0
EYE REDNESS: 0
SEIZURES: 0
BLURRED VISION: 0
GASTROINTESTINAL NEGATIVE: 1
CHILLS: 0
FEVER: 0
WHEEZING: 0
HALLUCINATIONS: 0
SPEECH CHANGE: 0
SHORTNESS OF BREATH: 0
COUGH: 0

## 2022-12-05 ENCOUNTER — TELEPHONE (OUTPATIENT)
Dept: FAMILY MEDICINE | Age: 27
End: 2022-12-05

## 2022-12-31 ENCOUNTER — WALK IN (OUTPATIENT)
Dept: URGENT CARE | Age: 27
End: 2022-12-31

## 2022-12-31 VITALS
DIASTOLIC BLOOD PRESSURE: 66 MMHG | TEMPERATURE: 98.1 F | RESPIRATION RATE: 20 BRPM | OXYGEN SATURATION: 97 % | HEART RATE: 111 BPM | SYSTOLIC BLOOD PRESSURE: 108 MMHG

## 2022-12-31 DIAGNOSIS — J10.1 INFLUENZA A: Primary | ICD-10-CM

## 2022-12-31 DIAGNOSIS — J06.9 VIRAL URI WITH COUGH: ICD-10-CM

## 2022-12-31 LAB
FLUAV AG UPPER RESP QL IA.RAPID: POSITIVE
FLUBV AG UPPER RESP QL IA.RAPID: NEGATIVE
SARS-COV+SARS-COV-2 AG RESP QL IA.RAPID: NOT DETECTED
TEST LOT EXPIRATION DATE: ABNORMAL
TEST LOT NUMBER: ABNORMAL

## 2022-12-31 PROCEDURE — 87428 SARSCOV & INF VIR A&B AG IA: CPT | Performed by: CLINICAL NURSE SPECIALIST

## 2022-12-31 PROCEDURE — 99213 OFFICE O/P EST LOW 20 MIN: CPT | Performed by: CLINICAL NURSE SPECIALIST

## 2022-12-31 RX ORDER — GUAIFENESIN 600 MG/1
600 TABLET, EXTENDED RELEASE ORAL 2 TIMES DAILY
Qty: 20 TABLET | Refills: 0 | COMMUNITY
Start: 2022-12-31 | End: 2023-03-15 | Stop reason: ALTCHOICE

## 2022-12-31 RX ORDER — OSELTAMIVIR PHOSPHATE 75 MG/1
75 CAPSULE ORAL 2 TIMES DAILY
Qty: 10 CAPSULE | Refills: 0 | Status: SHIPPED | OUTPATIENT
Start: 2022-12-31 | End: 2023-01-05

## 2022-12-31 RX ORDER — BENZONATATE 100 MG/1
100 CAPSULE ORAL 3 TIMES DAILY PRN
Qty: 15 CAPSULE | Refills: 1 | Status: SHIPPED | OUTPATIENT
Start: 2022-12-31 | End: 2023-03-15 | Stop reason: ALTCHOICE

## 2022-12-31 RX ORDER — FLUTICASONE PROPIONATE 50 MCG
1 SPRAY, SUSPENSION (ML) NASAL EVERY 12 HOURS PRN
Qty: 16 G | COMMUNITY
Start: 2022-12-31 | End: 2023-03-15 | Stop reason: ALTCHOICE

## 2022-12-31 ASSESSMENT — ENCOUNTER SYMPTOMS
SPEECH DIFFICULTY: 0
LIGHT-HEADEDNESS: 0
WEAKNESS: 0
APPETITE CHANGE: 1
ABDOMINAL PAIN: 0
CONSTIPATION: 0
SINUS PAIN: 0
FATIGUE: 1
TROUBLE SWALLOWING: 0
SINUS PRESSURE: 0
CHEST TIGHTNESS: 0
WHEEZING: 0
COUGH: 1
SHORTNESS OF BREATH: 1
SORE THROAT: 1
VOICE CHANGE: 0
ACTIVITY CHANGE: 1
CHILLS: 1
DIARRHEA: 0
BACK PAIN: 0
VOMITING: 0
NAUSEA: 0
FACIAL SWELLING: 0
FEVER: 0
DIZZINESS: 0

## 2023-01-17 ENCOUNTER — EXTERNAL RECORD (OUTPATIENT)
Dept: HEALTH INFORMATION MANAGEMENT | Facility: OTHER | Age: 28
End: 2023-01-17

## 2023-01-21 ENCOUNTER — LAB SERVICES (OUTPATIENT)
Dept: LAB | Age: 28
End: 2023-01-21

## 2023-01-21 DIAGNOSIS — N52.8 OTHER MALE ERECTILE DYSFUNCTION: Primary | ICD-10-CM

## 2023-01-21 LAB — TESTOST SERPL-MCNC: 271.9 NG/DL (ref 280–1100)

## 2023-01-21 PROCEDURE — 84403 ASSAY OF TOTAL TESTOSTERONE: CPT | Performed by: INTERNAL MEDICINE

## 2023-01-21 PROCEDURE — 36415 COLL VENOUS BLD VENIPUNCTURE: CPT | Performed by: INTERNAL MEDICINE

## 2023-01-24 ENCOUNTER — EXTERNAL RECORD (OUTPATIENT)
Dept: HEALTH INFORMATION MANAGEMENT | Facility: OTHER | Age: 28
End: 2023-01-24

## 2023-01-25 ENCOUNTER — TELEPHONE (OUTPATIENT)
Dept: LAB | Age: 28
End: 2023-01-25

## 2023-01-30 ENCOUNTER — APPOINTMENT (OUTPATIENT)
Dept: LAB | Age: 28
End: 2023-01-30

## 2023-02-01 ENCOUNTER — LAB SERVICES (OUTPATIENT)
Dept: LAB | Age: 28
End: 2023-02-01

## 2023-02-01 DIAGNOSIS — E29.1 TESTICULAR HYPOFUNCTION: Primary | ICD-10-CM

## 2023-02-01 PROCEDURE — 84146 ASSAY OF PROLACTIN: CPT | Performed by: INTERNAL MEDICINE

## 2023-02-01 PROCEDURE — 82670 ASSAY OF TOTAL ESTRADIOL: CPT | Performed by: INTERNAL MEDICINE

## 2023-02-01 PROCEDURE — 36415 COLL VENOUS BLD VENIPUNCTURE: CPT | Performed by: INTERNAL MEDICINE

## 2023-02-01 PROCEDURE — 84270 ASSAY OF SEX HORMONE GLOBUL: CPT | Performed by: INTERNAL MEDICINE

## 2023-02-01 PROCEDURE — 84403 ASSAY OF TOTAL TESTOSTERONE: CPT | Performed by: INTERNAL MEDICINE

## 2023-02-01 PROCEDURE — 83002 ASSAY OF GONADOTROPIN (LH): CPT | Performed by: INTERNAL MEDICINE

## 2023-02-02 LAB
ESTRADIOL SERPL-MCNC: 29 PG/ML
LH SERPL-ACNC: 2.9 MUNITS/ML (ref 1.5–9.3)
PROLACTIN SERPL-MCNC: 8.1 NG/ML (ref 2.1–17.7)
SHBG SERPL-SCNC: 14 NMOL/L (ref 11–80)
TESTOST SERPL-MCNC: 269.4 NG/DL (ref 280–1100)

## 2023-03-14 ENCOUNTER — EXTERNAL RECORD (OUTPATIENT)
Dept: HEALTH INFORMATION MANAGEMENT | Facility: OTHER | Age: 28
End: 2023-03-14

## 2023-03-15 ENCOUNTER — LAB REQUISITION (OUTPATIENT)
Dept: LAB | Age: 28
End: 2023-03-15

## 2023-03-15 ENCOUNTER — OFFICE VISIT (OUTPATIENT)
Dept: FAMILY MEDICINE | Age: 28
End: 2023-03-15

## 2023-03-15 VITALS
HEART RATE: 71 BPM | WEIGHT: 159 LBS | DIASTOLIC BLOOD PRESSURE: 68 MMHG | BODY MASS INDEX: 24.1 KG/M2 | OXYGEN SATURATION: 98 % | HEIGHT: 68 IN | TEMPERATURE: 97.2 F | SYSTOLIC BLOOD PRESSURE: 102 MMHG

## 2023-03-15 DIAGNOSIS — L28.2 PRURITIC RASH: Primary | ICD-10-CM

## 2023-03-15 DIAGNOSIS — E29.1 TESTICULAR HYPOFUNCTION: ICD-10-CM

## 2023-03-15 DIAGNOSIS — N52.8 OTHER MALE ERECTILE DYSFUNCTION: ICD-10-CM

## 2023-03-15 PROBLEM — R19.5 LOOSE STOOLS: Status: RESOLVED | Noted: 2022-08-18 | Resolved: 2023-03-15

## 2023-03-15 PROBLEM — R25.1 PHYSIOLOGICAL TREMOR: Status: RESOLVED | Noted: 2022-02-01 | Resolved: 2023-03-15

## 2023-03-15 PROCEDURE — 99214 OFFICE O/P EST MOD 30 MIN: CPT | Performed by: FAMILY MEDICINE

## 2023-03-15 RX ORDER — TRIAMCINOLONE ACETONIDE 1 MG/G
CREAM TOPICAL 2 TIMES DAILY PRN
Qty: 30 G | Refills: 0 | Status: SHIPPED | OUTPATIENT
Start: 2023-03-15

## 2023-03-15 ASSESSMENT — ENCOUNTER SYMPTOMS
HALLUCINATIONS: 0
SPEECH CHANGE: 0
FEVER: 0
GASTROINTESTINAL NEGATIVE: 1
EYE REDNESS: 0
COUGH: 0
CHILLS: 0
EYE DISCHARGE: 0
WHEEZING: 0
SHORTNESS OF BREATH: 0
SEIZURES: 0
BLURRED VISION: 0

## 2023-03-15 ASSESSMENT — PATIENT HEALTH QUESTIONNAIRE - PHQ9
SUM OF ALL RESPONSES TO PHQ9 QUESTIONS 1 AND 2: 0
1. LITTLE INTEREST OR PLEASURE IN DOING THINGS: NOT AT ALL
CLINICAL INTERPRETATION OF PHQ2 SCORE: NO FURTHER SCREENING NEEDED
2. FEELING DOWN, DEPRESSED OR HOPELESS: NOT AT ALL
SUM OF ALL RESPONSES TO PHQ9 QUESTIONS 1 AND 2: 0

## 2023-03-17 PROBLEM — L70.8 OTHER ACNE: Status: ACTIVE | Noted: 2023-03-17

## 2023-04-13 ENCOUNTER — WALK IN (OUTPATIENT)
Dept: URGENT CARE | Age: 28
End: 2023-04-13

## 2023-04-13 VITALS
WEIGHT: 165 LBS | SYSTOLIC BLOOD PRESSURE: 124 MMHG | HEART RATE: 84 BPM | TEMPERATURE: 99.3 F | OXYGEN SATURATION: 97 % | DIASTOLIC BLOOD PRESSURE: 60 MMHG | HEIGHT: 68 IN | RESPIRATION RATE: 19 BRPM | BODY MASS INDEX: 25.01 KG/M2

## 2023-04-13 DIAGNOSIS — J02.9 PHARYNGITIS, UNSPECIFIED ETIOLOGY: Primary | ICD-10-CM

## 2023-04-13 LAB
INTERNAL PROCEDURAL CONTROLS ACCEPTABLE: YES
S PYO AG THROAT QL IA.RAPID: NEGATIVE
TEST LOT EXPIRATION DATE: NORMAL
TEST LOT NUMBER: NORMAL

## 2023-04-13 PROCEDURE — 87880 STREP A ASSAY W/OPTIC: CPT | Performed by: NURSE PRACTITIONER

## 2023-04-13 PROCEDURE — 99213 OFFICE O/P EST LOW 20 MIN: CPT | Performed by: NURSE PRACTITIONER

## 2023-04-13 ASSESSMENT — ENCOUNTER SYMPTOMS
SORE THROAT: 1
GASTROINTESTINAL NEGATIVE: 1
CONSTITUTIONAL NEGATIVE: 1
RESPIRATORY NEGATIVE: 1

## 2023-04-20 ENCOUNTER — EXTERNAL RECORD (OUTPATIENT)
Dept: HEALTH INFORMATION MANAGEMENT | Facility: OTHER | Age: 28
End: 2023-04-20

## 2023-05-16 ENCOUNTER — LAB SERVICES (OUTPATIENT)
Dept: LAB | Age: 28
End: 2023-05-16

## 2023-05-16 PROCEDURE — 36415 COLL VENOUS BLD VENIPUNCTURE: CPT | Performed by: CLINICAL MEDICAL LABORATORY

## 2023-05-16 PROCEDURE — 80076 HEPATIC FUNCTION PANEL: CPT | Performed by: CLINICAL MEDICAL LABORATORY

## 2023-05-16 PROCEDURE — 83002 ASSAY OF GONADOTROPIN (LH): CPT | Performed by: CLINICAL MEDICAL LABORATORY

## 2023-05-16 PROCEDURE — 84403 ASSAY OF TOTAL TESTOSTERONE: CPT | Performed by: CLINICAL MEDICAL LABORATORY

## 2023-05-16 PROCEDURE — 82670 ASSAY OF TOTAL ESTRADIOL: CPT | Performed by: CLINICAL MEDICAL LABORATORY

## 2023-05-16 PROCEDURE — 83001 ASSAY OF GONADOTROPIN (FSH): CPT | Performed by: CLINICAL MEDICAL LABORATORY

## 2023-05-17 LAB
ALBUMIN SERPL-MCNC: 4.3 G/DL (ref 3.6–5.1)
ALP SERPL-CCNC: 41 UNITS/L (ref 45–117)
ALT SERPL-CCNC: 21 UNITS/L
AST SERPL-CCNC: 12 UNITS/L
BILIRUB CONJ SERPL-MCNC: 0.2 MG/DL (ref 0–0.2)
BILIRUB SERPL-MCNC: 0.7 MG/DL (ref 0.2–1)
ESTRADIOL SERPL-MCNC: 56 PG/ML
FSH SERPL-ACNC: 12.7 MUNITS/ML (ref 1.4–18.1)
LH SERPL-ACNC: 7 MUNITS/ML (ref 1.5–9.3)
PROT SERPL-MCNC: 7.4 G/DL (ref 6.4–8.2)
TESTOST SERPL-MCNC: 673.7 NG/DL (ref 280–1100)

## 2023-05-31 ENCOUNTER — TELEPHONE (OUTPATIENT)
Dept: FAMILY MEDICINE | Age: 28
End: 2023-05-31

## 2023-05-31 ENCOUNTER — EXTERNAL RECORD (OUTPATIENT)
Dept: HEALTH INFORMATION MANAGEMENT | Facility: OTHER | Age: 28
End: 2023-05-31

## 2023-05-31 DIAGNOSIS — N52.9 ERECTILE DYSFUNCTION, UNSPECIFIED ERECTILE DYSFUNCTION TYPE: Primary | ICD-10-CM

## 2023-06-02 ENCOUNTER — LAB REQUISITION (OUTPATIENT)
Dept: LAB | Age: 28
End: 2023-06-02

## 2023-06-02 DIAGNOSIS — E29.1 TESTICULAR HYPOFUNCTION: ICD-10-CM

## 2023-06-02 DIAGNOSIS — N52.8 OTHER MALE ERECTILE DYSFUNCTION: ICD-10-CM

## 2023-06-08 ENCOUNTER — EXTERNAL RECORD (OUTPATIENT)
Dept: HEALTH INFORMATION MANAGEMENT | Facility: OTHER | Age: 28
End: 2023-06-08

## 2023-06-12 ENCOUNTER — LAB SERVICES (OUTPATIENT)
Dept: LAB | Age: 28
End: 2023-06-12

## 2023-06-12 ENCOUNTER — OFFICE VISIT (OUTPATIENT)
Dept: FAMILY MEDICINE | Age: 28
End: 2023-06-12

## 2023-06-12 VITALS
WEIGHT: 156.75 LBS | HEIGHT: 68 IN | DIASTOLIC BLOOD PRESSURE: 72 MMHG | HEART RATE: 86 BPM | OXYGEN SATURATION: 99 % | SYSTOLIC BLOOD PRESSURE: 115 MMHG | TEMPERATURE: 98.3 F | BODY MASS INDEX: 23.76 KG/M2

## 2023-06-12 DIAGNOSIS — R19.7 DIARRHEA OF PRESUMED INFECTIOUS ORIGIN: ICD-10-CM

## 2023-06-12 DIAGNOSIS — R19.7 DIARRHEA, UNSPECIFIED TYPE: Primary | ICD-10-CM

## 2023-06-12 DIAGNOSIS — R50.9 FEVER, UNSPECIFIED FEVER CAUSE: ICD-10-CM

## 2023-06-12 DIAGNOSIS — R19.7 DIARRHEA, UNSPECIFIED TYPE: ICD-10-CM

## 2023-06-12 PROCEDURE — 36415 COLL VENOUS BLD VENIPUNCTURE: CPT | Performed by: FAMILY MEDICINE

## 2023-06-12 PROCEDURE — 80053 COMPREHEN METABOLIC PANEL: CPT | Performed by: INTERNAL MEDICINE

## 2023-06-12 PROCEDURE — 99213 OFFICE O/P EST LOW 20 MIN: CPT | Performed by: FAMILY MEDICINE

## 2023-06-12 PROCEDURE — 85025 COMPLETE CBC W/AUTO DIFF WBC: CPT | Performed by: INTERNAL MEDICINE

## 2023-06-12 PROCEDURE — 87635 SARS-COV-2 COVID-19 AMP PRB: CPT | Performed by: INTERNAL MEDICINE

## 2023-06-12 ASSESSMENT — PATIENT HEALTH QUESTIONNAIRE - PHQ9
CLINICAL INTERPRETATION OF PHQ2 SCORE: NO FURTHER SCREENING NEEDED
SUM OF ALL RESPONSES TO PHQ9 QUESTIONS 1 AND 2: 0
1. LITTLE INTEREST OR PLEASURE IN DOING THINGS: NOT AT ALL
2. FEELING DOWN, DEPRESSED OR HOPELESS: NOT AT ALL
SUM OF ALL RESPONSES TO PHQ9 QUESTIONS 1 AND 2: 0

## 2023-06-12 ASSESSMENT — PAIN SCALES - GENERAL: PAINLEVEL: 0

## 2023-06-13 ENCOUNTER — LAB SERVICES (OUTPATIENT)
Dept: LAB | Age: 28
End: 2023-06-13

## 2023-06-13 DIAGNOSIS — R19.7 DIARRHEA, UNSPECIFIED TYPE: ICD-10-CM

## 2023-06-13 LAB
ALBUMIN SERPL-MCNC: 3.7 G/DL (ref 3.6–5.1)
ALBUMIN/GLOB SERPL: 1.1 {RATIO} (ref 1–2.4)
ALP SERPL-CCNC: 39 UNITS/L (ref 45–117)
ALT SERPL-CCNC: 23 UNITS/L
ANION GAP SERPL CALC-SCNC: 10 MMOL/L (ref 7–19)
AST SERPL-CCNC: 12 UNITS/L
BASOPHILS # BLD: 0 K/MCL (ref 0–0.3)
BASOPHILS NFR BLD: 0 %
BILIRUB SERPL-MCNC: 1.3 MG/DL (ref 0.2–1)
BUN SERPL-MCNC: 18 MG/DL (ref 6–20)
BUN/CREAT SERPL: 16 (ref 7–25)
CALCIUM SERPL-MCNC: 9.6 MG/DL (ref 8.4–10.2)
CHLORIDE SERPL-SCNC: 104 MMOL/L (ref 97–110)
CO2 SERPL-SCNC: 30 MMOL/L (ref 21–32)
CREAT SERPL-MCNC: 1.14 MG/DL (ref 0.67–1.17)
DEPRECATED RDW RBC: 37.8 FL (ref 39–50)
EOSINOPHIL # BLD: 0.1 K/MCL (ref 0–0.5)
EOSINOPHIL NFR BLD: 2 %
ERYTHROCYTE [DISTWIDTH] IN BLOOD: 12.3 % (ref 11–15)
FASTING DURATION TIME PATIENT: 2 HOURS (ref 0–999)
GFR SERPLBLD BASED ON 1.73 SQ M-ARVRAT: 90 ML/MIN
GLOBULIN SER-MCNC: 3.4 G/DL (ref 2–4)
GLUCOSE SERPL-MCNC: 93 MG/DL (ref 70–99)
HCT VFR BLD CALC: 42 % (ref 39–51)
HGB BLD-MCNC: 14.3 G/DL (ref 13–17)
IMM GRANULOCYTES # BLD AUTO: 0 K/MCL (ref 0–0.2)
IMM GRANULOCYTES # BLD: 0 %
LYMPHOCYTES # BLD: 1.3 K/MCL (ref 1–4.8)
LYMPHOCYTES NFR BLD: 20 %
MCH RBC QN AUTO: 29.1 PG (ref 26–34)
MCHC RBC AUTO-ENTMCNC: 34 G/DL (ref 32–36.5)
MCV RBC AUTO: 85.5 FL (ref 78–100)
MONOCYTES # BLD: 0.9 K/MCL (ref 0.3–0.9)
MONOCYTES NFR BLD: 14 %
NEUTROPHILS # BLD: 4 K/MCL (ref 1.8–7.7)
NEUTROPHILS NFR BLD: 64 %
NRBC BLD MANUAL-RTO: 0 /100 WBC
PLATELET # BLD AUTO: 219 K/MCL (ref 140–450)
POTASSIUM SERPL-SCNC: 4.3 MMOL/L (ref 3.4–5.1)
PROT SERPL-MCNC: 7.1 G/DL (ref 6.4–8.2)
RBC # BLD: 4.91 MIL/MCL (ref 4.5–5.9)
SARS-COV-2 RNA RESP QL NAA+PROBE: NOT DETECTED
SERVICE CMNT-IMP: NORMAL
SERVICE CMNT-IMP: NORMAL
SODIUM SERPL-SCNC: 140 MMOL/L (ref 135–145)
WBC # BLD: 6.3 K/MCL (ref 4.2–11)

## 2023-06-13 PROCEDURE — 87077 CULTURE AEROBIC IDENTIFY: CPT | Performed by: INTERNAL MEDICINE

## 2023-06-13 PROCEDURE — 87507 IADNA-DNA/RNA PROBE TQ 12-25: CPT | Performed by: INTERNAL MEDICINE

## 2023-06-13 PROCEDURE — 87046 STOOL CULTR AEROBIC BACT EA: CPT | Performed by: INTERNAL MEDICINE

## 2023-06-14 ENCOUNTER — TELEPHONE (OUTPATIENT)
Dept: FAMILY MEDICINE | Age: 28
End: 2023-06-14

## 2023-06-14 DIAGNOSIS — A02.0 SALMONELLA ENTERITIS: Primary | ICD-10-CM

## 2023-06-14 LAB
ADV 40+41 FIB PROT STL QL NAA+PROBE: NOT DETECTED
ASTRO TYP 1-8 RNA STL QL NAA+NON-PROBE: NOT DETECTED
C CAYETANENSIS DNA STL QL NAA+NON-PROBE: NOT DETECTED
C COLI+JEJ+LAR 16S RRNA STL QL NAA+PROBE: NOT DETECTED
C PARVUM+HOMINIS COWP STL QL NAA+PROBE: NOT DETECTED
E HISTOLYTICA 18S RRNA STL QL NAA+PROBE: NOT DETECTED
EAEC PAA PLAS AGGR+AATA ST NAA+NON-PRB: NOT DETECTED
EC STX1+STX2 GENES STL QL NAA+NON-PROBE: NOT DETECTED
EPEC EAE GENE STL QL NAA+NON-PROBE: NOT DETECTED
ETEC ELTA+ESTB GENES STL QL NAA+PROBE: NOT DETECTED
G LAMBLIA 18S RRNA STL QL NAA+PROBE: NOT DETECTED
NOROVIRUS GI+II RNA STL QL NAA+NON-PROBE: NOT DETECTED
P SHIGELLOIDES DNA STL QL NAA+NON-PROBE: NOT DETECTED
RVA VP6 STL QL NAA+PROBE: NOT DETECTED
SALMONELLA SP INVA+FLIC STL QL NAA+PROBE: DETECTED
SAPO I+II+IV+V RNA STL QL NAA+NON-PROBE: NOT DETECTED
SHIGELLA SP+EIEC IPAH ST NAA+NON-PROBE: NOT DETECTED
V CHOL+PARA+VUL DNA STL QL NAA+NON-PROBE: NOT DETECTED
VIBRIO CHOL TOXIN CTXA STL QL NAA+PROBE: NOT DETECTED
Y ENTEROCOL DNA STL QL NAA+NON-PROBE: NOT DETECTED

## 2023-06-14 RX ORDER — CIPROFLOXACIN 500 MG/1
500 TABLET, FILM COATED ORAL 2 TIMES DAILY
Qty: 10 TABLET | Refills: 0 | Status: SHIPPED | OUTPATIENT
Start: 2023-06-14 | End: 2023-06-19

## 2023-06-18 LAB — AEROMONAS SPEC QL CULT: NORMAL

## 2023-07-28 ENCOUNTER — E-ADVICE (OUTPATIENT)
Dept: FAMILY MEDICINE | Age: 28
End: 2023-07-28

## 2023-07-28 DIAGNOSIS — M25.512 CHRONIC LEFT SHOULDER PAIN: Primary | ICD-10-CM

## 2023-07-28 DIAGNOSIS — G89.29 CHRONIC LEFT SHOULDER PAIN: Primary | ICD-10-CM

## 2023-08-02 ENCOUNTER — CLINICAL ABSTRACT (OUTPATIENT)
Dept: HEALTH INFORMATION MANAGEMENT | Facility: OTHER | Age: 28
End: 2023-08-02

## 2023-08-04 DIAGNOSIS — M75.32 CALCIFIC TENDINITIS OF LEFT SHOULDER: Primary | ICD-10-CM

## 2023-09-11 ENCOUNTER — E-ADVICE (OUTPATIENT)
Dept: FAMILY MEDICINE | Age: 28
End: 2023-09-11

## 2023-10-23 ENCOUNTER — E-ADVICE (OUTPATIENT)
Dept: FAMILY MEDICINE | Age: 28
End: 2023-10-23

## 2023-10-23 DIAGNOSIS — R53.83 FATIGUE: Primary | ICD-10-CM

## 2024-02-05 ENCOUNTER — APPOINTMENT (OUTPATIENT)
Dept: ENDOCRINOLOGY | Age: 29
End: 2024-02-05
Attending: FAMILY MEDICINE

## 2024-02-19 ENCOUNTER — TELEPHONE (OUTPATIENT)
Dept: FAMILY MEDICINE | Age: 29
End: 2024-02-19

## 2024-02-19 DIAGNOSIS — R53.82 CHRONIC FATIGUE: ICD-10-CM

## 2024-02-19 DIAGNOSIS — Z79.899 MEDICATION MANAGEMENT: ICD-10-CM

## 2024-02-19 DIAGNOSIS — Z13.0 SCREENING FOR ENDOCRINE, METABOLIC AND IMMUNITY DISORDER: ICD-10-CM

## 2024-02-19 DIAGNOSIS — Z13.228 SCREENING FOR ENDOCRINE, METABOLIC AND IMMUNITY DISORDER: ICD-10-CM

## 2024-02-19 DIAGNOSIS — Z13.29 SCREENING FOR ENDOCRINE, METABOLIC AND IMMUNITY DISORDER: ICD-10-CM

## 2024-02-19 DIAGNOSIS — R53.83 FATIGUE, UNSPECIFIED TYPE: Primary | ICD-10-CM

## 2024-03-02 ENCOUNTER — APPOINTMENT (OUTPATIENT)
Dept: LAB | Age: 29
End: 2024-03-02

## 2024-03-02 DIAGNOSIS — Z13.29 SCREENING FOR ENDOCRINE, METABOLIC AND IMMUNITY DISORDER: ICD-10-CM

## 2024-03-02 DIAGNOSIS — Z13.228 SCREENING FOR ENDOCRINE, METABOLIC AND IMMUNITY DISORDER: ICD-10-CM

## 2024-03-02 DIAGNOSIS — R53.82 CHRONIC FATIGUE: ICD-10-CM

## 2024-03-02 DIAGNOSIS — Z13.0 SCREENING FOR ENDOCRINE, METABOLIC AND IMMUNITY DISORDER: ICD-10-CM

## 2024-03-02 DIAGNOSIS — Z79.899 MEDICATION MANAGEMENT: ICD-10-CM

## 2024-03-02 LAB
25(OH)D3+25(OH)D2 SERPL-MCNC: 21.5 NG/ML (ref 30–100)
ALBUMIN SERPL-MCNC: 4.3 G/DL (ref 3.6–5.1)
ALBUMIN/GLOB SERPL: 1.5 {RATIO} (ref 1–2.4)
ALP SERPL-CCNC: 55 UNITS/L (ref 45–117)
ALT SERPL-CCNC: 26 UNITS/L
ANION GAP SERPL CALC-SCNC: 7 MMOL/L (ref 7–19)
AST SERPL-CCNC: 8 UNITS/L
BASOPHILS # BLD: 0 K/MCL (ref 0–0.3)
BASOPHILS NFR BLD: 0 %
BILIRUB SERPL-MCNC: 1.3 MG/DL (ref 0.2–1)
BUN SERPL-MCNC: 14 MG/DL (ref 6–20)
BUN/CREAT SERPL: 13 (ref 7–25)
CALCIUM SERPL-MCNC: 9.3 MG/DL (ref 8.4–10.2)
CHLORIDE SERPL-SCNC: 105 MMOL/L (ref 97–110)
CHOLEST SERPL-MCNC: 115 MG/DL
CHOLEST/HDLC SERPL: 2.1 {RATIO}
CO2 SERPL-SCNC: 32 MMOL/L (ref 21–32)
CREAT SERPL-MCNC: 1.06 MG/DL (ref 0.67–1.17)
DEPRECATED RDW RBC: 37.8 FL (ref 39–50)
EGFRCR SERPLBLD CKD-EPI 2021: >90 ML/MIN/{1.73_M2}
EOSINOPHIL # BLD: 0.1 K/MCL (ref 0–0.5)
EOSINOPHIL NFR BLD: 1 %
ERYTHROCYTE [DISTWIDTH] IN BLOOD: 12.4 % (ref 11–15)
FASTING DURATION TIME PATIENT: ABNORMAL H
GLOBULIN SER-MCNC: 2.8 G/DL (ref 2–4)
GLUCOSE SERPL-MCNC: 110 MG/DL (ref 70–99)
HBA1C MFR BLD: 5.2 % (ref 4.5–5.6)
HCT VFR BLD CALC: 45.9 % (ref 39–51)
HDLC SERPL-MCNC: 55 MG/DL
HGB BLD-MCNC: 15.9 G/DL (ref 13–17)
IMM GRANULOCYTES # BLD AUTO: 0 K/MCL (ref 0–0.2)
IMM GRANULOCYTES # BLD: 0 %
LDLC SERPL CALC-MCNC: 47 MG/DL
LYMPHOCYTES # BLD: 1.4 K/MCL (ref 1–4.8)
LYMPHOCYTES NFR BLD: 18 %
MCH RBC QN AUTO: 29.4 PG (ref 26–34)
MCHC RBC AUTO-ENTMCNC: 34.6 G/DL (ref 32–36.5)
MCV RBC AUTO: 84.8 FL (ref 78–100)
MONOCYTES # BLD: 0.6 K/MCL (ref 0.3–0.9)
MONOCYTES NFR BLD: 9 %
NEUTROPHILS # BLD: 5.4 K/MCL (ref 1.8–7.7)
NEUTROPHILS NFR BLD: 72 %
NONHDLC SERPL-MCNC: 60 MG/DL
NRBC BLD MANUAL-RTO: 0 /100 WBC
PLATELET # BLD AUTO: 242 K/MCL (ref 140–450)
POTASSIUM SERPL-SCNC: 4.7 MMOL/L (ref 3.4–5.1)
PROT SERPL-MCNC: 7.1 G/DL (ref 6.4–8.2)
RBC # BLD: 5.41 MIL/MCL (ref 4.5–5.9)
SODIUM SERPL-SCNC: 139 MMOL/L (ref 135–145)
TRIGL SERPL-MCNC: 67 MG/DL
VIT B12 SERPL-MCNC: 383 PG/ML (ref 211–911)
WBC # BLD: 7.5 K/MCL (ref 4.2–11)

## 2024-03-02 PROCEDURE — 85025 COMPLETE CBC W/AUTO DIFF WBC: CPT | Performed by: CLINICAL MEDICAL LABORATORY

## 2024-03-02 PROCEDURE — 82306 VITAMIN D 25 HYDROXY: CPT | Performed by: CLINICAL MEDICAL LABORATORY

## 2024-03-02 PROCEDURE — 80061 LIPID PANEL: CPT | Performed by: CLINICAL MEDICAL LABORATORY

## 2024-03-02 PROCEDURE — 80053 COMPREHEN METABOLIC PANEL: CPT | Performed by: CLINICAL MEDICAL LABORATORY

## 2024-03-02 PROCEDURE — 82607 VITAMIN B-12: CPT | Performed by: CLINICAL MEDICAL LABORATORY

## 2024-03-02 PROCEDURE — 83036 HEMOGLOBIN GLYCOSYLATED A1C: CPT | Performed by: CLINICAL MEDICAL LABORATORY

## 2024-03-02 PROCEDURE — 36415 COLL VENOUS BLD VENIPUNCTURE: CPT | Performed by: FAMILY MEDICINE

## 2024-03-05 ENCOUNTER — APPOINTMENT (OUTPATIENT)
Dept: FAMILY MEDICINE | Age: 29
End: 2024-03-05

## 2024-03-05 VITALS
SYSTOLIC BLOOD PRESSURE: 114 MMHG | DIASTOLIC BLOOD PRESSURE: 75 MMHG | HEART RATE: 66 BPM | HEIGHT: 68 IN | BODY MASS INDEX: 26.04 KG/M2 | OXYGEN SATURATION: 99 % | WEIGHT: 171.85 LBS

## 2024-03-05 DIAGNOSIS — Z00.00 ENCOUNTER FOR GENERAL ADULT MEDICAL EXAMINATION W/O ABNORMAL FINDINGS: Primary | ICD-10-CM

## 2024-03-05 DIAGNOSIS — E55.9 VITAMIN D DEFICIENCY: ICD-10-CM

## 2024-03-05 DIAGNOSIS — E80.4 GILBERT'S SYNDROME: ICD-10-CM

## 2024-03-05 PROBLEM — M25.512 CHRONIC LEFT SHOULDER PAIN: Status: RESOLVED | Noted: 2022-11-30 | Resolved: 2024-03-05

## 2024-03-05 PROBLEM — R19.7 DIARRHEA OF PRESUMED INFECTIOUS ORIGIN: Status: RESOLVED | Noted: 2023-06-12 | Resolved: 2024-03-05

## 2024-03-05 PROBLEM — L28.2 PRURITIC RASH: Status: RESOLVED | Noted: 2023-03-15 | Resolved: 2024-03-05

## 2024-03-05 PROBLEM — R53.82 CHRONIC FATIGUE: Status: RESOLVED | Noted: 2022-11-30 | Resolved: 2024-03-05

## 2024-03-05 PROBLEM — F41.9 ANXIETY, MILD: Status: RESOLVED | Noted: 2022-02-01 | Resolved: 2024-03-05

## 2024-03-05 PROBLEM — G89.29 CHRONIC LEFT SHOULDER PAIN: Status: RESOLVED | Noted: 2022-11-30 | Resolved: 2024-03-05

## 2024-03-05 PROBLEM — L70.8 OTHER ACNE: Status: RESOLVED | Noted: 2023-03-17 | Resolved: 2024-03-05

## 2024-03-05 PROCEDURE — 99395 PREV VISIT EST AGE 18-39: CPT | Performed by: FAMILY MEDICINE

## 2024-03-05 PROCEDURE — 99214 OFFICE O/P EST MOD 30 MIN: CPT | Performed by: FAMILY MEDICINE

## 2024-03-05 ASSESSMENT — ENCOUNTER SYMPTOMS
ALLERGIC/IMMUNOLOGIC NEGATIVE: 1
GASTROINTESTINAL NEGATIVE: 1
ENDOCRINE NEGATIVE: 1
CONSTITUTIONAL NEGATIVE: 1
RESPIRATORY NEGATIVE: 1
HALLUCINATIONS: 0
HEMATOLOGIC/LYMPHATIC NEGATIVE: 1
NEUROLOGICAL NEGATIVE: 1
EYES NEGATIVE: 1

## 2024-03-05 ASSESSMENT — PATIENT HEALTH QUESTIONNAIRE - PHQ9
CLINICAL INTERPRETATION OF PHQ2 SCORE: NO FURTHER SCREENING NEEDED
2. FEELING DOWN, DEPRESSED OR HOPELESS: NOT AT ALL
SUM OF ALL RESPONSES TO PHQ9 QUESTIONS 1 AND 2: 0
SUM OF ALL RESPONSES TO PHQ9 QUESTIONS 1 AND 2: 0
1. LITTLE INTEREST OR PLEASURE IN DOING THINGS: NOT AT ALL

## 2024-03-05 ASSESSMENT — PAIN SCALES - GENERAL: PAINLEVEL: 0

## 2024-04-25 ENCOUNTER — E-ADVICE (OUTPATIENT)
Dept: ADMINISTRATIVE | Facility: OTHER | Age: 29
End: 2024-04-25

## 2024-10-24 ENCOUNTER — OFFICE VISIT (OUTPATIENT)
Dept: INTEGRATIVE MEDICINE | Facility: CLINIC | Age: 29
End: 2024-10-24
Payer: COMMERCIAL

## 2024-10-24 VITALS
WEIGHT: 174.19 LBS | SYSTOLIC BLOOD PRESSURE: 118 MMHG | HEART RATE: 84 BPM | DIASTOLIC BLOOD PRESSURE: 68 MMHG | OXYGEN SATURATION: 98 % | BODY MASS INDEX: 25.8 KG/M2 | HEIGHT: 69 IN

## 2024-10-24 DIAGNOSIS — R14.0 BLOATING: Primary | ICD-10-CM

## 2024-10-24 DIAGNOSIS — R53.83 OTHER FATIGUE: ICD-10-CM

## 2024-10-24 DIAGNOSIS — E61.8 MICRONUTRIENTS DEFICIENCY: ICD-10-CM

## 2024-10-24 DIAGNOSIS — F32.A DEPRESSION, UNSPECIFIED DEPRESSION TYPE: ICD-10-CM

## 2024-10-24 DIAGNOSIS — R79.89 LOW TESTOSTERONE: ICD-10-CM

## 2024-10-24 DIAGNOSIS — R41.840 DISTURBED CONCENTRATION: ICD-10-CM

## 2024-10-24 DIAGNOSIS — R73.09 ELEVATED GLUCOSE: ICD-10-CM

## 2024-10-24 DIAGNOSIS — F41.9 ANXIETY: ICD-10-CM

## 2024-10-24 DIAGNOSIS — E55.9 VITAMIN D DEFICIENCY: ICD-10-CM

## 2024-10-24 NOTE — PATIENT INSTRUCTIONS
Plan   1) fasting blood work at [facility]   2) gluten free for 6-8 week - monitor bloating  3) future we might consider food sensitivity testing   4) Future neurotransmitter testing   5) testing for hormones, metabolic, thyroid, micronutrient, tgf beta

## 2024-10-24 NOTE — PROGRESS NOTES
Coy Hanson is a 28 year old male.  Chief Complaint   Patient presents with    [facility]     Labs. Post-COVID symptoms (3 years )       HPI:   Coy presents for initial evaluation,     Main concern:   Timeline   2021 - got the covid injection - 2   He started having severe exhaustion.   His PCP wanted to give him a stimulant to wake him up in the morning.   Around this time he lost his dog, he lost his job all in one week.   He is in therapy - they are thinking he needs a depressant medication   Motivation is low.   He has a hard time pushing though his work   He has been trying to get into the field for years and not motivated like he thinks he should be   He is able to work exercise     Thyroid:   Isolated over 2 TSH      Body/rash:   Not a lot of body ache, muscle pain,   He is recovering from work outs as expected     He is on tretinoin as needed    Right side of his chest bump on his skin sometimes it shrinks and grows     Hormones -   He had low testosterone and took Clomid for 3 months. He was more irritable on the clomid. He has been off clomid for a year      GI -   No acid reflux   Bloating is present with all types of food - bloating is new and loose stool   Bowels are more loose than hard     Mental state -   Post college he started putting a lot of pressure on himself.     Went to [facility] [place] - He was told he does not have ADD     Symptoms    Classic ADD (1) Inattentive ADD (2) Overfocused ADD (3) Temporal lobe ADD (4) Limbic ADD  (5) Ring of fire   (6) Anxious ADD (7)   Short attention span (C)  * * * * * *   Easily distracted (C)  * * * * * *   Procrastination (C)  * * * * * *   Disorganized (C)  * * * * * *   Poor follow through(C)  * * * * * *   Poor impulse control (C) * * * * * * *   Inattentive          Hyperactive Maybe as a trouble  Sometimes  Sometimes  Sometimes  Sometimes     Trouble listening to other talk *         Poor attention to detail *         Careless  mistakes  * *        forgetful * *        Tendency to lose things * Sometimes         fidget/restless *         Trouble waiting turns *         Acts as though driven by motor          Noisy *         Talking excessively *     sometimes    Interrupts *         Trouble Focusing           Time Management problems  *        Excessive daydreaming          Complaints of being bored  *        Appears unmotivated  *        Appears uninterested/apathetic  *   *     Being tired/sluggish/slow moving     *     Appears spacy or preoccupied    *      Excessive worry          Getting stuck in neg thought loop          Oppositional/argumentative   *   *     Classic ADD (1) Inattentive ADD (2) Overfocused ADD (3) Temporal lobe ADD (4) Limbi ADD  (5) Ring of fire   (6) Anxious ADD (7)   Compulsive behaviors   *       Trouble identifying options          Holds grudges          Difficulty shifting attention from one task to the next          Holds on to opinions and does not listen to others   *       Needs to have things done a certain way   *   *    Memory problems          Auditory processing issues    College she had to record things and write it down       Irritability     * *    Quick temper          Periods of confusion/spaciness    *      Periods of panic/fear for no reason    *      Visual changes - seeing shadows/obj change shape    *      Ariadna - vu    *      Sensitive/mild paranoia    *      Headaches/abd pain no cause    *   *stress sx   Hx of head injury    *      Dark thoughts          Possible learning disability    Proven no learning disability      Social isolation     *  *   Negativity          Feeling/perceived of hopelessness          Feelings of guilt          Sleep changes (too much or little     *     Chronic low self esteem           Classic ADD (1) Inattentive ADD (2) Overfocused ADD (3) Temporal lobe ADD (4) Limbic ADD  (5) Ring of fire   (6) Anxious ADD (7)   Sensitive to light/noise/clothes/touch      *     Inflexible rigid thinking      *    Cyclic mood changes  (highs and low)      *    Periods of mean/nasty or insensitive behavior      *    Unpredictable behavior      *    Impulsivity       *    Grandiose thinking      *    Rapid speech      *    Racing thoughts      *    Anxious or fearful       *   Freeze in social situations       *   Fear of being judged          Conflict avoidant          \      Weight History:   No struggle with weight      Childhood -     Childhood was good.  He is a first generation from [place]  Parents do not put a lot of pressure on him. They expect a lot from hims  He puts a lot of pressure on himself.       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     Sleep -   Sleep is good. He cannot sleep well in hotels. He is having to sleep in a hotel 2-3 times a month. He is a very light sleeper. He cannot go back to sleep.     NO mold exposure  No known tick bite   No known mono      ALLERGIES   Allergies[1]     CURRENT MEDICATIONS:     No current outpatient medications on file.       MEDICAL HISTORY:     Past Medical History:    Anxiety       SURGICAL HISTORY:   History reviewed. No pertinent surgical history.    FAMILY HISTORY:      Family History   Problem Relation Age of Onset    No Known Problems Father     No Known Problems Mother        SOCIAL HISTORY:     Social History     Tobacco Use    Smoking status: Never   Vaping Use    Vaping status: Never Used   Substance and Sexual Activity    Alcohol use: Yes     Alcohol/week: 1.0 standard drink of alcohol     Types: 1 Glasses of wine per week      Comment: socially    Drug use: Never       REVIEW OF SYSTEMS:   Review of Systems     See HPI for pertinent positives and negatives     PHYSICAL EXAM:     Vitals:    10/24/24 1117   BP: 118/68   BP Location: Right arm   Patient Position: Sitting   Cuff Size: adult   Pulse: 84   SpO2: 98%   Weight: 174 lb 3.2 oz (79 kg)   Height: 5' 9\" (1.753 m)       Physical Exam     Physical Exam  Constitutional:       Appearance: Normal appearance.   Neurological:      General: No focal deficit present.      Mental Status: She is alert and oriented to person, place, and time.   Psychiatric:         Mood and Affect: Mood normal.    ASSESSMENT AND PLAN:     Concerned about possible inflammation creating symptoms of anxiety, dep, focus issues - tgf beta ordered  Going to consider food sensitivity and possible neurotransmitter deficiency    Plan   1) fasting blood work at [facility]   2) gluten free for 6-8 week - monitor bloating  3) future we might consider food sensitivity testing   4) Future neurotransmitter testing   5) testing for hormones, metabolic, thyroid, micronutrient, tgf beta       1. Bloating  - TGF BETA, PLASMA [[id number]][Q]  - Tissue Transglutaminase AB IgG  - Tissue Transglutaminase Ab, IgA  - GLIADIN (DEAMIDATED) AB (IGG, IGA)  - Endomysial Antibody IGA  - Immunoglobulin A, Quant  - ADULT FOOD ALLERGY PROFILE [[id number]] [Q]    2. Anxiety  - TGF BETA, PLASMA [55099][Q]    3. Depression, unspecified depression type  - TGF BETA, PLASMA [49914][Q]    4. Vitamin D deficiency  - VITAMIN D, SCREEN [01420][Q]  - TGF BETA, PLASMA [32910][Q]    5. Elevated glucose  - Insulin; Future  - Hemoglobin A1C; Future  - Leptin, Serum; Future  - Comp Metabolic Panel (14); Future  - TGF BETA, PLASMA [[id number]][Q]  - Insulin  - Hemoglobin A1C  - Leptin, Serum  - Comp Metabolic Panel (14)    6. Low testosterone  - Free T3 (Triiodothryronine); Future  - Reverse T3, Serum; Future  - Thyroid Antithyroglobulin AB; Future  - Thyroid Peroxidase (TPO) AB;  Future  - Free T4, (Free Thyroxine); Future  - Assay, Thyroid Stim Hormone; Future  - Testosterone,Total and Weakly Bound w/ SHBG; Future  - Progesterone; Future  - Estrogens Fractionated, Serum; Future  - Prolactin; Future  - TGF BETA, PLASMA [[id number]][Q]  - Free T3 (Triiodothryronine)  - Reverse T3, Serum  - Thyroid Antithyroglobulin AB  - Thyroid Peroxidase (TPO) AB  - Free T4, (Free Thyroxine)  - Assay, Thyroid Stim Hormone  - Testosterone,Total and Weakly Bound w/ SHBG  - Progesterone  - Estrogens Fractionated, Serum  - Prolactin    7. Micronutrients deficiency  - CBC With Differential With Platelet; Future  - TGF BETA, PLASMA [75505][Q]  - Vitamin B12 [E]; Future  - Folic Acid Serum (Folate); Future  - Vitamin B6; Future  - CBC With Differential With Platelet  - Vitamin B12 [E]  - Folic Acid Serum (Folate)  - Vitamin B6    8. Other fatigue  - TGF BETA, PLASMA [30812][Q]    9. Disturbed concentration  - TGF BETA, PLASMA [33351][Q]      Time spent with patient: Over 60 minutes spent in chart review and in direct communication with patient obtaining and reviewing history, creating a unique care plan, explaining the rationale for treatment, reviewing potential SE and overall treatment plan,  documenting all clinical information in Epic. Over 50% of this time was in education, counseling and coordination of care.     No follow-ups on file.      Problem List Items Addressed This Visit    None  Visit Diagnoses       Bloating    -  Primary    Relevant Orders    TGF BETA, PLASMA [[id number]][Q]    Tissue Transglutaminase AB IgG    Tissue Transglutaminase Ab, IgA    GLIADIN (DEAMIDATED) AB (IGG, IGA)    Endomysial Antibody IGA    Immunoglobulin A, Quant    ADULT FOOD ALLERGY PROFILE [[id number]] [Q]    Anxiety        Relevant Orders    TGF BETA, PLASMA [31932][Q]    Depression, unspecified depression type        Relevant Orders    TGF BETA, PLASMA [44584][Q]    Vitamin D deficiency        Relevant Orders    VITAMIN D, SCREEN  [92521][Q]    TGF BETA, PLASMA [47249][Q]    Elevated glucose        Relevant Orders    Insulin    Hemoglobin A1C    Leptin, Serum    Comp Metabolic Panel (14)    TGF BETA, PLASMA [47159][Q]    Low testosterone        Relevant Orders    Free T3 (Triiodothryronine)    Reverse T3, Serum    Thyroid Antithyroglobulin AB    Thyroid Peroxidase (TPO) AB    Free T4, (Free Thyroxine)    Assay, Thyroid Stim Hormone    Testosterone,Total and Weakly Bound w/ SHBG    Progesterone    Estrogens Fractionated, Serum    Prolactin    TGF BETA, PLASMA [42135][Q]    Micronutrients deficiency        Relevant Orders    CBC With Differential With Platelet    TGF BETA, PLASMA [57211][Q]    Vitamin B12 [E]    Folic Acid Serum (Folate)    Vitamin B6    Other fatigue        Relevant Orders    TGF BETA, PLASMA [94846][Q]    Disturbed concentration        Relevant Orders    TGF BETA, PLASMA [[id number]][Q]             Orders Placed This Visit:  Orders Placed This Encounter   Procedures    Free T3 (Triiodothryronine)    Reverse T3, Serum    Thyroid Antithyroglobulin AB    Thyroid Peroxidase (TPO) AB    Free T4, (Free Thyroxine)    Assay, Thyroid Stim Hormone    Insulin    Hemoglobin A1C    Leptin, Serum    CBC With Differential With Platelet    Comp Metabolic Panel (14)    Testosterone,Total and Weakly Bound w/ SHBG    Progesterone    Estrogens Fractionated, Serum    Prolactin    VITAMIN D, SCREEN [44228][Q]    TGF BETA, PLASMA [37410][Q]    Vitamin B12 [E]    Folic Acid Serum (Folate)    Vitamin B6    Tissue Transglutaminase AB IgG    Tissue Transglutaminase Ab, IgA    GLIADIN (DEAMIDATED) AB (IGG, IGA)    Endomysial Antibody IGA    Immunoglobulin A, Quant    ADULT FOOD ALLERGY PROFILE [[id number]] [Q]     No orders of the defined types were placed in this encounter.      Patient Instructions   Plan   1) fasting blood work at [facility]   2) gluten free for 6-8 week - monitor bloating  3) future we might consider food sensitivity testing   4) Future  neurotransmitter testing   5) testing for hormones, metabolic, thyroid, micronutrient, tgf beta     No follow-ups on file.    Patient affirmed understanding of plan and all questions were answered.     Martha Holloway PA-C         [1] Not on File

## 2024-10-30 LAB
GLIADIN (DEAMIDATED) AB (IGG): <1 U/ML
GLIADIN (DEAMIDATED)$AB (IGA): 1.5 U/ML

## 2024-11-01 ENCOUNTER — TELEPHONE (OUTPATIENT)
Dept: INTEGRATIVE MEDICINE | Facility: CLINIC | Age: 29
End: 2024-11-01

## 2024-11-01 NOTE — TELEPHONE ENCOUNTER
Patient contacted the clinic, he is concerned with the test result TGF Beta, Plasma it is showing out of range. Scheduled 12/18/2024, placed on waiting list.     Please advise, Thank you

## 2024-11-05 LAB
ALBUMIN/GLOBULIN RATIO: 1.8 (CALC) (ref 1–2.5)
ALBUMIN: 4.6 G/DL (ref 3.6–5.1)
ALKALINE PHOSPHATASE: 50 U/L (ref 36–130)
ALMOND (F20) IGE: <0.1 KU/L
ALT: 22 U/L (ref 9–46)
AST: 21 U/L (ref 10–40)
BILIRUBIN, TOTAL: 0.7 MG/DL (ref 0.2–1.2)
BUN/CREATININE RATIO: 16 (CALC) (ref 6–22)
BUN: 21 MG/DL (ref 7–25)
CALCIUM: 9.5 MG/DL (ref 8.6–10.3)
CARBON DIOXIDE: 29 MMOL/L (ref 20–32)
CASHEW NUT (F202) IGE: <0.1 KU/L
CHLORIDE: 100 MMOL/L (ref 98–110)
CLASS: 0
CODFISH (F3) IGE: <0.1 KU/L
CREATININE: 1.35 MG/DL (ref 0.6–1.24)
EGFR: 73 ML/MIN/1.73M2
EGG WHITE (F1) IGE: <0.1 KU/L
ENDOMYSIAL ANTIBODY SCR$(IGA) W/REFL TO TITER: NEGATIVE
ESTRADIOL, ULTRASENSITIVE$/MS/MS: 28 PG/ML
ESTRIOL, /MS/MS, SERUM: <0.1 NG/ML
ESTRONE, /MS/MS: 44 PG/ML
FOLATE, SERUM: 12.2 NG/ML
FREE TESTOSTERONE: 75.6 PG/ML (ref 35–155)
GLOBULIN: 2.5 G/DL (CALC) (ref 1.9–3.7)
GLUCOSE: 88 MG/DL (ref 65–99)
HAZELNUT-FOOD, IGE: <0.1 KU/L
HUMAN TRANSFORMING GROWTH FACTOR BETA 1 (TGF-B1): ABNORMAL PG/ML (ref 344–2382)
IMMUNOGLOBULIN A: 143 MG/DL (ref 47–310)
INSULIN: 7.3 UIU/ML
LEPTIN: 2.6 NG/ML
MILK (F2) IGE: <0.1 KU/L
PEANUT (F13) IGE: <0.1 KU/L
POTASSIUM: 4.2 MMOL/L (ref 3.5–5.3)
PROGESTERONE: 0.6 NG/ML
PROLACTIN: 9.1 NG/ML (ref 2–18)
PROTEIN, TOTAL: 7.1 G/DL (ref 6.1–8.1)
SALMON, IGE: <0.1 KU/L
SCALLOP (F338) IGE: <0.1 KU/L
SESAME SEED (F10) IGE: <0.1 KU/L
SHRIMP (F24) IGE: <0.1 KU/L
SODIUM: 138 MMOL/L (ref 135–146)
SOYBEAN (F14) IGE: <0.1 KU/L
T3 REVERSE, /MS/MS: 14 NG/DL (ref 8–25)
T3, FREE: 3.5 PG/ML (ref 2.3–4.2)
T4, FREE: 1.2 NG/DL (ref 0.8–1.8)
TESTOSTERONE, TOTAL,$/MS/MS: 338 NG/DL (ref 250–1100)
THYROGLOBULIN ANTIBODIES: <1 IU/ML
THYROID PEROXIDASE$ANTIBODIES: 1 IU/ML
TISSUE TRANSGLUTAMINASE$ANTIBODY, IGA: <1 U/ML
TISSUE TRANSGLUTAMINASE$ANTIBODY, IGG: <1 U/ML
TSH: 4.02 MIU/L (ref 0.4–4.5)
TUNA (F40) IGE: <0.1 KU/L
VITAMIN B12: 448 PG/ML (ref 200–1100)
VITAMIN B6: 28.3 NG/ML (ref 2.1–21.7)
VITAMIN D, 25-OH, TOTAL: 31 NG/ML (ref 30–100)
WALNUT (F256) IGE: <0.1 KU/L
WHEAT (F4) IGE: <0.1 KU/L

## 2024-11-11 ENCOUNTER — TELEPHONE (OUTPATIENT)
Dept: FAMILY MEDICINE CLINIC | Facility: CLINIC | Age: 29
End: 2024-11-11

## 2024-11-12 ENCOUNTER — LAB ENCOUNTER (OUTPATIENT)
Dept: LAB | Facility: HOSPITAL | Age: 29
End: 2024-11-12
Attending: PHYSICIAN ASSISTANT
Payer: COMMERCIAL

## 2024-11-12 DIAGNOSIS — R65.10 SYSTEMIC INFLAMMATORY RESPONSE SYNDROME (HCC): Primary | ICD-10-CM

## 2024-11-12 PROCEDURE — 86663 EPSTEIN-BARR ANTIBODY: CPT

## 2024-11-12 PROCEDURE — 83520 IMMUNOASSAY QUANT NOS NONAB: CPT

## 2024-11-12 PROCEDURE — 86160 COMPLEMENT ANTIGEN: CPT

## 2024-11-12 PROCEDURE — 83519 RIA NONANTIBODY: CPT

## 2024-12-18 ENCOUNTER — OFFICE VISIT (OUTPATIENT)
Dept: INTEGRATIVE MEDICINE | Facility: CLINIC | Age: 29
End: 2024-12-18
Payer: COMMERCIAL

## 2024-12-18 VITALS
SYSTOLIC BLOOD PRESSURE: 100 MMHG | DIASTOLIC BLOOD PRESSURE: 60 MMHG | HEART RATE: 71 BPM | OXYGEN SATURATION: 96 % | WEIGHT: 169.38 LBS | BODY MASS INDEX: 25 KG/M2

## 2024-12-18 DIAGNOSIS — R65.10 SYSTEMIC INFLAMMATORY RESPONSE SYNDROME (HCC): ICD-10-CM

## 2024-12-18 DIAGNOSIS — R41.840 DISTURBED CONCENTRATION: ICD-10-CM

## 2024-12-18 DIAGNOSIS — R14.0 BLOATING: Primary | ICD-10-CM

## 2024-12-18 DIAGNOSIS — F41.9 ANXIETY: ICD-10-CM

## 2024-12-18 DIAGNOSIS — F32.A DEPRESSION, UNSPECIFIED DEPRESSION TYPE: ICD-10-CM

## 2024-12-18 PROBLEM — R25.1 PHYSIOLOGICAL TREMOR: Status: ACTIVE | Noted: 2022-02-01

## 2024-12-18 PROBLEM — E55.9 VITAMIN D DEFICIENCY: Status: ACTIVE | Noted: 2024-03-05

## 2024-12-18 PROBLEM — M79.641 PAIN IN RIGHT HAND: Status: ACTIVE | Noted: 2022-02-01

## 2024-12-18 PROBLEM — N52.9 ERECTILE DYSFUNCTION: Status: ACTIVE | Noted: 2022-08-18

## 2024-12-18 PROBLEM — M77.10 LATERAL EPICONDYLITIS: Status: ACTIVE | Noted: 2019-04-12

## 2024-12-18 PROBLEM — E80.4 GILBERT'S SYNDROME: Status: ACTIVE | Noted: 2024-03-05

## 2024-12-18 PROBLEM — G62.9 NEUROPATHY: Status: ACTIVE | Noted: 2018-12-12

## 2024-12-18 PROCEDURE — 99215 OFFICE O/P EST HI 40 MIN: CPT | Performed by: PHYSICIAN ASSISTANT

## 2024-12-18 NOTE — PROGRESS NOTES
Coy Hanson is a 29 year old male.  Chief Complaint   Patient presents with    Follow - Up     Patient presents for follow up on test results.        HPI:   Coy presents for follow up on fatigue, concentration     Updates from last visit:   Some days he has more energy but he does not know why     He did not notice a difference with his gluten removal testing     [facility] evaluation   TGF-B 26,700 (<2380) over active immune   C3a  90.2(<940) High C3 and c4a infection, high c4 and low c3 biotoxin  C4a 941 (<2830)  MMP-9 631 ( ) increase permeability of BBB   MSH 10 (35-81) regulates pituitary function low leads to FM, IBS, IC   Leptin 2.6 (M 0.5-13.8, F 1.1-27.5)   Insulin 7.3  (7-8)      Insulin -   EA- <9    B6- high before blood draw   Testosterone - 75 free     Thyroid:   Tsh is over 4  fT4 - over 1  fT3 - over 3     GI - eggs - make his stool runny      Stress -   He does not feel his job is bringing him stress. He feels that he makes himself stressed.     He overthinks a lot - his thoughts are like a merry go round     Other symptoms  Notes being more tired after exercise       HPI's FROM PREVIOUS VISITS    Coy presents for initial evaluation,     Main concern:   Timeline   2021 - got the covid injection - 2   He started having severe exhaustion.   His PCP wanted to give him a stimulant to wake him up in the morning.   Around this time he lost his dog, he lost his job all in one week.   He is in therapy - they are thinking he needs a depressant medication   Motivation is low.   He has a hard time pushing though his work   He has been trying to get into the field for years and not motivated like he thinks he should be   He is able to work exercise     Thyroid:   Isolated over 2 TSH      Body/rash:   Not a lot of body ache, muscle pain,   He is recovering from work outs as expected     He is on tretinoin as needed    Right side of his chest bump on his skin sometimes it shrinks and grows      Hormones -   He had low testosterone and took Clomid for 3 months. He was more irritable on the clomid. He has been off clomid for a year      GI -   No acid reflux   Bloating is present with all types of food - bloating is new and loose stool   Bowels are more loose than hard     Mental state -   Post college he started putting a lot of pressure on himself.     Went to [facility] - He was told he does not have ADD     Symptoms    Classic ADD (1) Inattentive ADD (2) Overfocused ADD (3) Temporal lobe ADD (4) Limbic ADD  (5) Ring of fire   (6) Anxious ADD (7)   Short attention span (C)  * * * * * *   Easily distracted (C)  * * * * * *   Procrastination (C)  * * * * * *   Disorganized (C)  * * * * * *   Poor follow through(C)  * * * * * *   Poor impulse control (C) * * * * * * *   Inattentive          Hyperactive Maybe as a trouble  Sometimes  Sometimes  Sometimes  Sometimes     Trouble listening to other talk *         Poor attention to detail *         Careless mistakes  * *        forgetful * *        Tendency to lose things * Sometimes         fidget/restless *         Trouble waiting turns *         Acts as though driven by motor          Noisy *         Talking excessively *     sometimes    Interrupts *         Trouble Focusing           Time Management problems  *        Excessive daydreaming          Complaints of being bored  *        Appears unmotivated  *        Appears uninterested/apathetic  *   *     Being tired/sluggish/slow moving     *     Appears spacy or preoccupied    *      Excessive worry          Getting stuck in neg thought loop          Oppositional/argumentative   *   *     Classic ADD (1) Inattentive ADD (2) Overfocused ADD (3) Temporal lobe ADD (4) Limbi ADD  (5) Ring of fire   (6) Anxious ADD (7)   Compulsive behaviors   *       Trouble identifying options          Holds grudges          Difficulty shifting attention from one task to the next          Holds on to opinions  and does not listen to others   *       Needs to have things done a certain way   *   *    Memory problems          Auditory processing issues    College she had to record things and write it down       Irritability     * *    Quick temper          Periods of confusion/spaciness    *      Periods of panic/fear for no reason    *      Visual changes - seeing shadows/obj change shape    *      Ariadan - vu    *      Sensitive/mild paranoia    *      Headaches/abd pain no cause    *   *stress sx   Hx of head injury    *      Dark thoughts          Possible learning disability    Proven no learning disability      Social isolation     *  *   Negativity          Feeling/perceived of hopelessness          Feelings of guilt          Sleep changes (too much or little     *     Chronic low self esteem           Classic ADD (1) Inattentive ADD (2) Overfocused ADD (3) Temporal lobe ADD (4) Limbic ADD  (5) Ring of fire   (6) Anxious ADD (7)   Sensitive to light/noise/clothes/touch      *    Inflexible rigid thinking      *    Cyclic mood changes  (highs and low)      *    Periods of mean/nasty or insensitive behavior      *    Unpredictable behavior      *    Impulsivity       *    Grandiose thinking      *    Rapid speech      *    Racing thoughts      *    Anxious or fearful       *   Freeze in social situations       *   Fear of being judged          Conflict avoidant          \      Weight History:   No struggle with weight      Childhood -     Childhood was good.  He is a first generation from [place]  Parents do not put a lot of pressure on him. They expect a lot from hims  He puts a lot of pressure on himself.       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     Sleep -   Sleep is good. He cannot sleep well in hotels. He is having to sleep in a hotel 2-3 times a month. He is a very light sleeper. He cannot go back to sleep.     NO mold exposure  No known tick bite   No known mono     ALLERGIES   Allergies[1]     CURRENT MEDICATIONS:     No current outpatient medications on file.       MEDICAL HISTORY:     Past Medical History:    Anxiety       SURGICAL HISTORY:   History reviewed. No pertinent surgical history.    FAMILY HISTORY:      Family History   Problem Relation Age of Onset    No Known Problems Father     No Known Problems Mother        SOCIAL HISTORY:     Social History     Socioeconomic History    Marital status: Unknown   Tobacco Use    Smoking status: Never   Vaping Use    Vaping status: Never Used   Substance and Sexual Activity    Alcohol use: Yes     Alcohol/week: 1.0 standard drink of alcohol     Types: 1 Glasses of wine per week     Comment: socially    Drug use: Never       REVIEW OF SYSTEMS:   Review of Systems     See HPI for pertinent positives and negatives     PHYSICAL EXAM:     Vitals:    12/18/24 1106   BP: 100/60   BP Location: Right arm   Patient Position: Sitting   Cuff Size: adult   Pulse: 71   SpO2: 96%   Weight: 169 lb 6.4 oz (76.8 kg)       Physical Exam         Physical Exam  Constitutional:       Appearance: Normal appearance.   Neurological:      General: No focal deficit present.      Mental Status: She is alert and oriented to person, place, and time.   Psychiatric:         Mood and Affect: Mood normal.    ASSESSMENT AND PLAN:     Visual contrast sensitivity test     [email or web address]    [email or web address]    Take test 18 inches from computer in a well lit room    Sent me results once you have them     Plan   1) continue B  complex  2)Continue Vitamin D   3) continue the phospholipids  Based on VCS   4) liver support/detox support  5) adrenal support   6) possible thyroid support     Discussed using zoloft to help with cyclical thoughts         1. Bloating    2. Anxiety    3. Depression, unspecified depression type    4. Systemic inflammatory response syndrome (HCC)    5. Disturbed concentration      Time spent with patient: Over 40 minutes spent in chart review and in direct communication with patient obtaining and reviewing history, creating a unique care plan, explaining the rationale for treatment, reviewing potential SE and overall treatment plan,  documenting all clinical information in Epic. Over 50% of this time was in education, counseling and coordination of care.     No follow-ups on file.      Problem List Items Addressed This Visit    None  Visit Diagnoses       Bloating    -  Primary    Anxiety        Depression, unspecified depression type        Systemic inflammatory response syndrome (HCC)        Disturbed concentration                 Orders Placed This Visit:  No orders of the defined types were placed in this encounter.    No orders of the defined types were placed in this encounter.      Patient Instructions   Visual contrast sensitivity test     [email or web address]    [email or web address]    Take test 18 inches from computer in a well lit room    Sent me results once you have them     Plan   1) continue B complex  2)Continue Vitamin D   3) continue the phospholipids  Based on VCS   4) liver support/detox support  5) adrenal support   6) possible thyroid support     Discussed using zoloft to help with cyclical thoughts       No follow-ups on file.    Patient affirmed understanding of plan and all questions were answered.     Martha Holloway PA-C       [1] No Known Allergies

## 2024-12-18 NOTE — PATIENT INSTRUCTIONS
Visual contrast sensitivity test     [email or web address]    [email or web address]    Take test 18 inches from computer in a well lit room    Sent me results once you have them     Plan   1) continue B complex  2)Continue Vitamin D   3) continue the phospholipids  Based on VCS   4) liver support/detox support  5) adrenal support   6) possible thyroid support     Discussed using zoloft to help with cyclical thoughts

## 2024-12-19 ENCOUNTER — PATIENT MESSAGE (OUTPATIENT)
Dept: INTEGRATIVE MEDICINE | Facility: CLINIC | Age: 29
End: 2024-12-19

## 2024-12-27 ENCOUNTER — TELEPHONE (OUTPATIENT)
Dept: INTEGRATIVE MEDICINE | Facility: CLINIC | Age: 29
End: 2024-12-27

## 2024-12-27 NOTE — TELEPHONE ENCOUNTER
Patient contacted clinic inquiring about discrepancies in co-pays he was billed from his insurance company. He received a bill for a $50 co-pay on DOS 10/24 (specialist co-pay) and a $25 co-pay on DOS 12/18 (PCP co-pay). Patient is requesting call back he is not sure if he should pay the co-pay he received if it is incorrect and if he was actually over charged on the original visit.    Please advise, Thank you

## 2025-01-02 NOTE — TELEPHONE ENCOUNTER
Based on our conversation and your results I would like you to add the following supplements       a-Gus  [facility]    This is to support your emotions, cyclical thought      Dr. Morelos's Liver Sauce  [employer]    To open detox pathways   1 tsp daily

## 2025-02-03 NOTE — TELEPHONE ENCOUNTER
I returned the patients call, informed the patient I will submit a request to refund the patient $25.00 as he should have only paid the pcp visit amount.

## 2025-03-10 LAB — HUMAN TRANSFORMING GROWTH FACTOR BETA 1 (TGF-B1): ABNORMAL PG/ML (ref 0–22062)

## 2025-03-21 ENCOUNTER — LAB ENCOUNTER (OUTPATIENT)
Dept: LAB | Age: 30
End: 2025-03-21
Attending: PHYSICIAN ASSISTANT
Payer: COMMERCIAL

## 2025-03-21 ENCOUNTER — TELEMEDICINE (OUTPATIENT)
Dept: INTEGRATIVE MEDICINE | Facility: CLINIC | Age: 30
End: 2025-03-21
Payer: COMMERCIAL

## 2025-03-21 DIAGNOSIS — R41.840 DISTURBED CONCENTRATION: Primary | ICD-10-CM

## 2025-03-21 DIAGNOSIS — R79.89 LOW TESTOSTERONE: ICD-10-CM

## 2025-03-21 DIAGNOSIS — R53.83 OTHER FATIGUE: ICD-10-CM

## 2025-03-21 DIAGNOSIS — R41.840 DISTURBED CONCENTRATION: ICD-10-CM

## 2025-03-21 LAB — DHEA-S SERPL-MCNC: 373.3 UG/DL

## 2025-03-21 PROCEDURE — 98006 SYNCH AUDIO-VIDEO EST MOD 30: CPT | Performed by: PHYSICIAN ASSISTANT

## 2025-03-21 PROCEDURE — 82627 DEHYDROEPIANDROSTERONE: CPT

## 2025-03-21 PROCEDURE — 36415 COLL VENOUS BLD VENIPUNCTURE: CPT

## 2025-03-21 NOTE — PROGRESS NOTES
Coy Hanson is a 29 year old male.  Chief Complaint   Patient presents with    Follow - Up     LAB Results       HPI:   Coy presents for follow up on fatigue, concentration     Updates from last visit:   Energy levels - From the fall/original visit   He feels he is yawning less.     What he was hoping to do better. No matter how many times a week he is still getting tired quickly.   He feels he is breathing heavier than he would expect     Gluten - If he has heavy meals. For 4 weeks he cut down on carbs. He was having chicken and broccoli. He was feeling less tired. No difference in the gym   Bowel movements - due to more fiber          Stress -   No worsening at work - work is stable   Sleep -   Sleep has been good - He is not great at sleeping in hotels     Supplements:   Plan   B complex  Vitamin D   the phospholipids - sunflower lecithin   liver support/detox support  adrenal support - no real benefit   possible thyroid support       HPI's FROM PREVIOUS VISITS      Coy presents for follow up on fatigue, concentration     Updates from last visit:   Some days he has more energy but he does not know why     He did not notice a difference with his gluten removal testing     CIRS Lab evaluation   TGF-B 26,700 (<2380) over active immune   C3a  90.2(<940) High C3 and c4a infection, high c4 and low c3 biotoxin  C4a 941 (<2830)  MMP-9 631 ( ) increase permeability of BBB   MSH 10 (35-81) regulates pituitary function low leads to FM, IBS, IC   Leptin 2.6 (M 0.5-13.8, F 1.1-27.5)   Insulin 7.3  (7-8)      Insulin -   EA- <9    B6- high before blood draw   Testosterone - 75 free     Thyroid:   Tsh is over 4  fT4 - over 1  fT3 - over 3     GI - eggs - make his stool runny      Stress -   He does not feel his job is bringing him stress. He feels that he makes himself stressed.     He overthinks a lot - his thoughts are like a merry go round     Other symptoms  Notes being more tired after exercise        HPI's FROM PREVIOUS VISITS    Coy presents for initial evaluation,     Main concern:   Timeline   2021 - got the covid injection - 2   He started having severe exhaustion.   His PCP wanted to give him a stimulant to wake him up in the morning.   Around this time he lost his dog, he lost his job all in one week.   He is in therapy - they are thinking he needs a depressant medication   Motivation is low.   He has a hard time pushing though his work   He has been trying to get into the field for years and not motivated like he thinks he should be   He is able to work exercise     Thyroid:   Isolated over 2 TSH      Body/rash:   Not a lot of body ache, muscle pain,   He is recovering from work outs as expected     He is on tretinoin as needed    Right side of his chest bump on his skin sometimes it shrinks and grows     Hormones -   He had low testosterone and took Clomid for 3 months. He was more irritable on the clomid. He has been off clomid for a year      GI -   No acid reflux   Bloating is present with all types of food - bloating is new and loose stool   Bowels are more loose than hard     Mental state -   Post college he started putting a lot of pressure on himself.     Went to [facility] - He was told he does not have ADD     Symptoms    Classic ADD (1) Inattentive ADD (2) Overfocused ADD (3) Temporal lobe ADD (4) Limbic ADD  (5) Ring of fire   (6) Anxious ADD (7)   Short attention span (C)  * * * * * *   Easily distracted (C)  * * * * * *   Procrastination (C)  * * * * * *   Disorganized (C)  * * * * * *   Poor follow through(C)  * * * * * *   Poor impulse control (C) * * * * * * *   Inattentive          Hyperactive Maybe as a trouble  Sometimes  Sometimes  Sometimes  Sometimes     Trouble listening to other talk *         Poor attention to detail *         Careless mistakes  * *        forgetful * *        Tendency to lose things * Sometimes         fidget/restless *         Trouble  waiting turns *         Acts as though driven by motor          Noisy *         Talking excessively *     sometimes    Interrupts *         Trouble Focusing           Time Management problems  *        Excessive daydreaming          Complaints of being bored  *        Appears unmotivated  *        Appears uninterested/apathetic  *   *     Being tired/sluggish/slow moving     *     Appears spacy or preoccupied    *      Excessive worry          Getting stuck in neg thought loop          Oppositional/argumentative   *   *     Classic ADD (1) Inattentive ADD (2) Overfocused ADD (3) Temporal lobe ADD (4) Limbi ADD  (5) Ring of fire   (6) Anxious ADD (7)   Compulsive behaviors   *       Trouble identifying options          Holds grudges          Difficulty shifting attention from one task to the next          Holds on to opinions and does not listen to others   *       Needs to have things done a certain way   *   *    Memory problems          Auditory processing issues    College she had to record things and write it down       Irritability     * *    Quick temper          Periods of confusion/spaciness    *      Periods of panic/fear for no reason    *      Visual changes - seeing shadows/obj change shape    *      Ariadna - vu    *      Sensitive/mild paranoia    *      Headaches/abd pain no cause    *   *stress sx   Hx of head injury    *      Dark thoughts          Possible learning disability    Proven no learning disability      Social isolation     *  *   Negativity          Feeling/perceived of hopelessness          Feelings of guilt          Sleep changes (too much or little     *     Chronic low self esteem           Classic ADD (1) Inattentive ADD (2) Overfocused ADD (3) Temporal lobe ADD (4) Limbic ADD  (5) Ring of fire   (6) Anxious ADD (7)   Sensitive to light/noise/clothes/touch      *    Inflexible rigid thinking      *    Cyclic mood changes  (highs and low)      *    Periods of mean/nasty or insensitive  behavior      *    Unpredictable behavior      *    Impulsivity       *    Grandiose thinking      *    Rapid speech      *    Racing thoughts      *    Anxious or fearful       *   Freeze in social situations       *   Fear of being judged          Conflict avoidant          \      Weight History:   No struggle with weight      Childhood -     Childhood was good.  He is a first generation from [place]  Parents do not put a lot of pressure on him. They expect a lot from hims  He puts a lot of pressure on himself.       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     Sleep -   Sleep is good. He cannot sleep well in hotels. He is having to sleep in a hotel 2-3 times a month. He is a very light sleeper. He cannot go back to sleep.     NO mold exposure  No known tick bite   No known mono     ALLERGIES   Allergies[1]     CURRENT MEDICATIONS:     No current outpatient medications on file.       MEDICAL HISTORY:     Past Medical History:    Anxiety       SURGICAL HISTORY:   History reviewed. No pertinent surgical history.    FAMILY HISTORY:      Family History   Problem Relation Age of Onset    No Known Problems Father     No Known Problems Mother        SOCIAL HISTORY:     Social History     Socioeconomic History    Marital status: Unknown   Tobacco Use    Smoking status: Never   Vaping Use    Vaping status: Never Used   Substance and Sexual Activity    Alcohol use: Yes     Alcohol/week: 1.0 standard drink of alcohol     Types: 1 Glasses of wine per week     Comment: socially    Drug use: Never       REVIEW OF SYSTEMS:   Review of  Systems     See HPI for pertinent positives and negatives     PHYSICAL EXAM:   There were no vitals filed for this visit.    Physical Exam       Physical Exam  Constitutional:       Appearance: Normal appearance.   Neurological:      General: No focal deficit present.      Mental Status: She is alert and oriented to person, place, and time.   Psychiatric:         Mood and Affect: Mood normal.    ASSESSMENT AND PLAN:     Plan     Get DHEA lab - I will decide if you are to add DHEA after     1) Sleep - Continue to use melatonin as needed in hotel rooms     2) B complex - pause on it for now   Vitamin D   the phospholipids - sunflower lecithin - stop   liver support/detox support - stop   adrenal support - no real benefit - stop     3) Start the following  Supplement recommendations:  MultiMedica™ for Men (120 capsules) ([employer]): Please take  2 capsules x twice per day / anytime.   Libido Stim-M (60 capsules) ([employer]): Please take  2 capsules x once per day / anytime  ongoing.   DHEA 25mg (60 capsules) ([employer]): Please take  1 capsule x once per day / anytime (Do not start until after I contact you about DHEA Lab )    4) take supplements for 3 months and then revisit hormones   Follow up in about 12-14 weeks, 2-3 weeks after blood work       Thank you for allowing me to participate in your care. I am unfortunately leaving [place] and my last day will be March 27th. Below are resources for you to continue care in our health system.     Integrative Medicine - [address], [place], [address]    · CLOVIS Hill    To learn more about this provider, or to make an appointment, I encourage you to view her profile online at [email or web address] or call the office at [phone].    Integrative Medicine - [address], [address], [place], [address]    · Soha Ramon MD, ROBERTO    · Deven Dominguez MD, FACP, ROBERTO    · Dr. Gorman  MD Maria M    · Shayan Robert MD    To learn more about these providers, or to make an appointment, I encourage you to view their profiles online at [email or web address] or call the office at [phone].        1. Disturbed concentration  - Dehydroepiandrosterone Sulfate; Future  - CBC With Differential With Platelet; Future  - Comp Metabolic Panel (14); Future  - Dehydroepiandrosterone Sulfate; Future  - Estrogens Fractionated, Serum; Future  - FSH; Future  - LH Fertility; Future  - Progesterone; Future  - Prolactin; Future  - Testosterone,Total and Weakly Bound w/ SHBG; Future  - Pregnenolone by MS/MS, Serum; Future    2. Other fatigue  - Dehydroepiandrosterone Sulfate; Future  - CBC With Differential With Platelet; Future  - Comp Metabolic Panel (14); Future  - Dehydroepiandrosterone Sulfate; Future  - Estrogens Fractionated, Serum; Future  - FSH; Future  - LH Fertility; Future  - Progesterone; Future  - Prolactin; Future  - Testosterone,Total and Weakly Bound w/ SHBG; Future  - Pregnenolone by MS/MS, Serum; Future    3. Low testosterone  - Dehydroepiandrosterone Sulfate; Future  - CBC With Differential With Platelet; Future  - Comp Metabolic Panel (14); Future  - Dehydroepiandrosterone Sulfate; Future  - Estrogens Fractionated, Serum; Future  - FSH; Future  - LH Fertility; Future  - Progesterone; Future  - Prolactin; Future  - Testosterone,Total and Weakly Bound w/ SHBG; Future  - Pregnenolone by MS/MS, Serum; Future      Time spent with patient: Over 35 minutes spent in chart review and in direct communication with patient obtaining and reviewing history, creating a unique care plan, explaining the rationale for treatment, reviewing potential SE and overall treatment plan,  documenting all clinical information in Epic. Over 50% of this time was in education, counseling and coordination of care.     No follow-ups on file.      Problem List Items Addressed This Visit     None  Visit Diagnoses       Disturbed concentration    -  Primary    Relevant Orders    Dehydroepiandrosterone Sulfate    CBC With Differential With Platelet    Comp Metabolic Panel (14)    Dehydroepiandrosterone Sulfate    Estrogens Fractionated, Serum    FSH    LH Fertility    Progesterone    Prolactin    Testosterone,Total and Weakly Bound w/ SHBG    Pregnenolone by MS/MS, Serum    Other fatigue        Relevant Orders    Dehydroepiandrosterone Sulfate    CBC With Differential With Platelet    Comp Metabolic Panel (14)    Dehydroepiandrosterone Sulfate    Estrogens Fractionated, Serum    FSH    LH Fertility    Progesterone    Prolactin    Testosterone,Total and Weakly Bound w/ SHBG    Pregnenolone by MS/MS, Serum    Low testosterone        Relevant Orders    Dehydroepiandrosterone Sulfate    CBC With Differential With Platelet    Comp Metabolic Panel (14)    Dehydroepiandrosterone Sulfate    Estrogens Fractionated, Serum    FSH    LH Fertility    Progesterone    Prolactin    Testosterone,Total and Weakly Bound w/ SHBG    Pregnenolone by MS/MS, Serum             Orders Placed This Visit:  Orders Placed This Encounter   Procedures    Dehydroepiandrosterone Sulfate    CBC With Differential With Platelet    Comp Metabolic Panel (14)    Dehydroepiandrosterone Sulfate    Estrogens Fractionated, Serum    FSH    LH Fertility    Progesterone    Prolactin    Testosterone,Total and Weakly Bound w/ SHBG    Pregnenolone by MS/MS, Serum     No orders of the defined types were placed in this encounter.      Patient Instructions   Plan     Get DHEA lab - I will decide if you are to add DHEA after     1) Sleep - Continue to use melatonin as needed in hotel rooms     2) B complex - pause on it for now   Vitamin D   the phospholipids - sunflower lecithin - stop   liver support/detox support - stop   adrenal support - no real benefit - stop     3) Start the following  Supplement recommendations:  MultiMedica™ for Men (120 capsules)  (Nu[employer]): Please take  2 capsules x twice per day / anytime.   Libido Stim-M (60 capsules) ([employer]): Please take  2 capsules x once per day / anytime  ongoing.   DHEA 25mg (60 capsules) ([employer]): Please take  1 capsule x once per day / anytime (Do not start until after I contact you about DHEA Lab )    4) take supplements for 3 months and then revisit hormones   Follow up in about 12-14 weeks, 2-3 weeks after blood work     5) future we may want to consider other modalities to boost testosterone       Thank you for allowing me to participate in your care. I am unfortunately leaving [place] and my last day will be March 27th. Below are resources for you to continue care in our health system.     Integrative Medicine - [address] [facility] [address] [facility], [facility] [address], [place], [address]    · CLOVIS Hill    To learn more about this provider, or to make an appointment, I encourage you to view her profile online at [email or web address] or call the office at [phone].    Integrative Medicine - [facility] [facility], [facility] [place], [address]    · Soha Ramon MD, ROBERTO    · Deven Dominguez MD, [facility], ROBERTO    · Dr. Vita Franz MD    · Shayan Robert MD    To learn more about these providers, or to make an appointment, I encourage you to view their profiles online at [email or web address] or call the office at [phone].      No follow-ups on file.    Patient affirmed understanding of plan and all questions were answered.     Martha Holloway PA-C         [1] No Known Allergies

## 2025-03-21 NOTE — PATIENT INSTRUCTIONS
Plan     Get DHEA lab - I will decide if you are to add DHEA after     1) Sleep - Continue to use melatonin as needed in hotel rooms     2) B complex - pause on it for now   Vitamin D   the phospholipids - sunflower lecithin - stop   liver support/detox support - stop   adrenal support - no real benefit - stop     3) Start the following  Supplement recommendations:  MultiMedica™ for Men (120 capsules) ([employer]): Please take  2 capsules x twice per day / anytime.   Libido Stim-M (60 capsules) ([employer]): Please take  2 capsules x once per day / anytime  ongoing.   DHEA 25mg (60 capsules) ([employer]): Please take  1 capsule x once per day / anytime (Do not start until after I contact you about DHEA Lab )    4) take supplements for 3 months and then revisit hormones   Follow up in about 12-14 weeks, 2-3 weeks after blood work     5) future we may want to consider other modalities to boost testosterone      Thank you for allowing me to participate in your care. I am unfortunately leaving [place] and my last day will be March 27th. Below are resources for you to continue care in our health system.     Integrative Medicine - [address] [facility] [address], [place], [address]    · CLOVIS Hill    To learn more about this provider, or to make an appointment, I encourage you to view her profile online at [email or web address] or call the office at [phone].    Integrative Medicine - [facility] [facility] [place], [address]    · Soha Ramon MD, ABOSUDHIR    · Deven Dominguez MD, FACP, ROBERTO    · Dr. Vita Franz MD    · Shayan Robert MD    To learn more about these providers, or to make an appointment, I encourage you to view their profiles online at [email or web address] or call the office at [phone].

## 2025-05-06 ENCOUNTER — E-ADVICE (OUTPATIENT)
Dept: FAMILY MEDICINE | Age: 30
End: 2025-05-06

## 2025-05-22 ENCOUNTER — TELEPHONE (OUTPATIENT)
Dept: INTEGRATIVE MEDICINE | Facility: CLINIC | Age: 30
End: 2025-05-22

## 2025-05-22 NOTE — TELEPHONE ENCOUNTER
I spoke to the patient. I asked the patient to send us all the bills along with payment receipt. I will see who I can speak with to see what can be done regarding the [facility] out of network status.

## 2025-05-22 NOTE — TELEPHONE ENCOUNTER
This patient was advised by the pathology office at [facility] and the billing department to contact our office with billing issues. He is receiving a duplicate bill from \"out of network\" pathologists for labs he completed in-network with [facility]. They stated that the provider Martha Holloway sent his labs to be reviewed to an \"out of network\" pathologist.     Please advise, Thank you

## 2025-05-28 NOTE — TELEPHONE ENCOUNTER
After getting in touch with , she explained that unfortunately the hospital and the Pathology group are not always in-network with the same insurance. She did also state that the Pathology group will give a 50% discount.     I spoke to the patient to relay information. The patient stated he was not happy. He stated it was not right that he has to get stuck with a bill after he did everything right and even called his insurance to make sure labs would be covered.

## 2025-06-04 ENCOUNTER — LAB SERVICES (OUTPATIENT)
Dept: LAB | Age: 30
End: 2025-06-04

## 2025-06-04 ENCOUNTER — APPOINTMENT (OUTPATIENT)
Dept: FAMILY MEDICINE | Age: 30
End: 2025-06-04

## 2025-06-04 VITALS
SYSTOLIC BLOOD PRESSURE: 119 MMHG | WEIGHT: 177.47 LBS | HEART RATE: 61 BPM | RESPIRATION RATE: 16 BRPM | HEIGHT: 68 IN | BODY MASS INDEX: 26.9 KG/M2 | OXYGEN SATURATION: 98 % | DIASTOLIC BLOOD PRESSURE: 75 MMHG

## 2025-06-04 DIAGNOSIS — Z13.228 SCREENING FOR ENDOCRINE, METABOLIC AND IMMUNITY DISORDER: ICD-10-CM

## 2025-06-04 DIAGNOSIS — Z00.00 ROUTINE PHYSICAL EXAMINATION: Primary | ICD-10-CM

## 2025-06-04 DIAGNOSIS — Z12.5 SCREENING PSA (PROSTATE SPECIFIC ANTIGEN): ICD-10-CM

## 2025-06-04 DIAGNOSIS — L28.2 PRURITIC RASH: ICD-10-CM

## 2025-06-04 DIAGNOSIS — Z13.29 SCREENING FOR ENDOCRINE, METABOLIC AND IMMUNITY DISORDER: ICD-10-CM

## 2025-06-04 DIAGNOSIS — E55.9 VITAMIN D DEFICIENCY: ICD-10-CM

## 2025-06-04 DIAGNOSIS — Z13.0 SCREENING FOR ENDOCRINE, METABOLIC AND IMMUNITY DISORDER: ICD-10-CM

## 2025-06-04 PROBLEM — N52.9 ERECTILE DYSFUNCTION: Status: RESOLVED | Noted: 2022-08-18 | Resolved: 2025-06-04

## 2025-06-04 PROBLEM — E80.4 GILBERT'S SYNDROME: Status: RESOLVED | Noted: 2024-03-05 | Resolved: 2025-06-04

## 2025-06-04 PROCEDURE — 36415 COLL VENOUS BLD VENIPUNCTURE: CPT | Performed by: FAMILY MEDICINE

## 2025-06-04 PROCEDURE — 84153 ASSAY OF PSA TOTAL: CPT | Performed by: CLINICAL MEDICAL LABORATORY

## 2025-06-04 PROCEDURE — 99395 PREV VISIT EST AGE 18-39: CPT | Performed by: FAMILY MEDICINE

## 2025-06-04 RX ORDER — TRIAMCINOLONE ACETONIDE 1 MG/G
CREAM TOPICAL 2 TIMES DAILY PRN
Qty: 45 G | Refills: 3 | Status: SHIPPED | OUTPATIENT
Start: 2025-06-04

## 2025-06-04 ASSESSMENT — PATIENT HEALTH QUESTIONNAIRE - PHQ9
1. LITTLE INTEREST OR PLEASURE IN DOING THINGS: NOT AT ALL
SUM OF ALL RESPONSES TO PHQ9 QUESTIONS 1 AND 2: 0
SUM OF ALL RESPONSES TO PHQ9 QUESTIONS 1 AND 2: 0
2. FEELING DOWN, DEPRESSED OR HOPELESS: NOT AT ALL
CLINICAL INTERPRETATION OF PHQ2 SCORE: NO FURTHER SCREENING NEEDED

## 2025-06-04 ASSESSMENT — ENCOUNTER SYMPTOMS
GASTROINTESTINAL NEGATIVE: 1
HEMATOLOGIC/LYMPHATIC NEGATIVE: 1
EYES NEGATIVE: 1
NEUROLOGICAL NEGATIVE: 1
ENDOCRINE NEGATIVE: 1
CONSTITUTIONAL NEGATIVE: 1
HALLUCINATIONS: 0
ALLERGIC/IMMUNOLOGIC NEGATIVE: 1
RESPIRATORY NEGATIVE: 1

## 2025-06-05 ENCOUNTER — RESULTS FOLLOW-UP (OUTPATIENT)
Dept: FAMILY MEDICINE | Age: 30
End: 2025-06-05

## 2025-06-05 LAB — PSA SERPL-MCNC: 0.52 NG/ML

## 2025-07-15 ENCOUNTER — APPOINTMENT (OUTPATIENT)
Dept: BEHAVIORAL HEALTH | Age: 30
End: 2025-07-15

## 2025-07-21 ENCOUNTER — APPOINTMENT (OUTPATIENT)
Dept: BEHAVIORAL HEALTH | Age: 30
End: 2025-07-21

## 2025-07-30 ENCOUNTER — APPOINTMENT (OUTPATIENT)
Dept: BEHAVIORAL HEALTH | Age: 30
End: 2025-07-30

## 2025-07-30 DIAGNOSIS — F32.1 CURRENT MODERATE EPISODE OF MAJOR DEPRESSIVE DISORDER WITHOUT PRIOR EPISODE  (CMD): Primary | ICD-10-CM

## 2025-07-30 PROCEDURE — 90792 PSYCH DIAG EVAL W/MED SRVCS: CPT | Performed by: STUDENT IN AN ORGANIZED HEALTH CARE EDUCATION/TRAINING PROGRAM

## 2025-07-30 RX ORDER — BUPROPION HYDROCHLORIDE 150 MG/1
150 TABLET ORAL DAILY
Qty: 30 TABLET | Refills: 1 | Status: SHIPPED | OUTPATIENT
Start: 2025-07-30

## 2025-09-22 ENCOUNTER — APPOINTMENT (OUTPATIENT)
Dept: BEHAVIORAL HEALTH | Age: 30
End: 2025-09-22